# Patient Record
Sex: MALE | Race: BLACK OR AFRICAN AMERICAN | Employment: UNEMPLOYED | URBAN - METROPOLITAN AREA
[De-identification: names, ages, dates, MRNs, and addresses within clinical notes are randomized per-mention and may not be internally consistent; named-entity substitution may affect disease eponyms.]

---

## 2024-05-03 ENCOUNTER — HOSPITAL ENCOUNTER (INPATIENT)
Facility: HOSPITAL | Age: 74
LOS: 7 days | Discharge: HOME | End: 2024-05-10
Attending: INTERNAL MEDICINE | Admitting: INTERNAL MEDICINE
Payer: MEDICARE

## 2024-05-03 ENCOUNTER — APPOINTMENT (OUTPATIENT)
Facility: HOSPITAL | Age: 74
End: 2024-05-03
Payer: MEDICARE

## 2024-05-03 DIAGNOSIS — N45.1 LEFT EPIDIDYMITIS: Primary | ICD-10-CM

## 2024-05-03 DIAGNOSIS — N30.01 ACUTE CYSTITIS WITH HEMATURIA: ICD-10-CM

## 2024-05-03 PROBLEM — M19.90 OSTEOARTHROSIS: Status: ACTIVE | Noted: 2017-05-31

## 2024-05-03 PROBLEM — N13.8 BENIGN PROSTATIC HYPERPLASIA WITH URINARY OBSTRUCTION: Status: ACTIVE | Noted: 2017-05-31

## 2024-05-03 PROBLEM — G47.33 OBSTRUCTIVE SLEEP APNEA: Status: ACTIVE | Noted: 2018-08-14

## 2024-05-03 PROBLEM — N40.1 BENIGN PROSTATIC HYPERPLASIA WITH URINARY OBSTRUCTION: Status: ACTIVE | Noted: 2017-05-31

## 2024-05-03 PROBLEM — N39.0 UTI (URINARY TRACT INFECTION): Status: ACTIVE | Noted: 2024-05-03

## 2024-05-03 PROBLEM — Z72.0 TOBACCO USER: Status: ACTIVE | Noted: 2017-05-31

## 2024-05-03 PROBLEM — N40.0 BPH WITH ELEVATED PSA: Status: ACTIVE | Noted: 2024-03-14

## 2024-05-03 PROBLEM — Z86.010 HISTORY OF COLONIC POLYPS: Status: ACTIVE | Noted: 2017-11-29

## 2024-05-03 PROBLEM — R97.20 BPH WITH ELEVATED PSA: Status: ACTIVE | Noted: 2024-03-14

## 2024-05-03 LAB
ALBUMIN SERPL-MCNC: 2.6 G/DL (ref 3.5–5)
ALBUMIN/GLOB SERPL: 0.4 (ref 1.1–2.2)
ALP SERPL-CCNC: 114 U/L (ref 45–117)
ALT SERPL-CCNC: 30 U/L (ref 12–78)
ANION GAP BLD CALC-SCNC: 12 (ref 10–20)
ANION GAP SERPL CALC-SCNC: 8 MMOL/L (ref 5–15)
APPEARANCE UR: ABNORMAL
AST SERPL-CCNC: 22 U/L (ref 15–37)
BACTERIA URNS QL MICRO: ABNORMAL /HPF
BASOPHILS # BLD: 0 K/UL (ref 0–0.1)
BASOPHILS NFR BLD: 0 % (ref 0–1)
BILIRUB SERPL-MCNC: 0.6 MG/DL (ref 0.2–1)
BILIRUB UR QL: NEGATIVE
BUN SERPL-MCNC: 14 MG/DL (ref 6–20)
BUN/CREAT SERPL: 12 (ref 12–20)
CA-I BLD-MCNC: 1.09 MMOL/L (ref 1.12–1.32)
CALCIUM SERPL-MCNC: 8.7 MG/DL (ref 8.5–10.1)
CHLORIDE BLD-SCNC: 98 MMOL/L (ref 100–108)
CHLORIDE SERPL-SCNC: 96 MMOL/L (ref 97–108)
CO2 BLD-SCNC: 26 MMOL/L (ref 19–24)
CO2 SERPL-SCNC: 29 MMOL/L (ref 21–32)
COLOR UR: YELLOW
CREAT SERPL-MCNC: 1.2 MG/DL (ref 0.7–1.3)
CREAT UR-MCNC: 0.9 MG/DL (ref 0.6–1.3)
DIFFERENTIAL METHOD BLD: ABNORMAL
EOSINOPHIL # BLD: 0 K/UL (ref 0–0.4)
EOSINOPHIL NFR BLD: 0 % (ref 0–7)
EPITH CASTS URNS QL MICRO: ABNORMAL /LPF
ERYTHROCYTE [DISTWIDTH] IN BLOOD BY AUTOMATED COUNT: 17.7 % (ref 11.5–14.5)
GLOBULIN SER CALC-MCNC: 6.3 G/DL (ref 2–4)
GLUCOSE BLD STRIP.AUTO-MCNC: 109 MG/DL (ref 74–106)
GLUCOSE SERPL-MCNC: 114 MG/DL (ref 65–100)
GLUCOSE UR STRIP.AUTO-MCNC: NEGATIVE MG/DL
HCT VFR BLD AUTO: 33.6 % (ref 36.6–50.3)
HGB BLD-MCNC: 10.5 G/DL (ref 12.1–17)
HGB UR QL STRIP: ABNORMAL
IMM GRANULOCYTES # BLD AUTO: 0 K/UL (ref 0–0.04)
IMM GRANULOCYTES NFR BLD AUTO: 0 % (ref 0–0.5)
KETONES UR QL STRIP.AUTO: NEGATIVE MG/DL
LACTATE BLD-SCNC: 1.25 MMOL/L (ref 0.4–2)
LEUKOCYTE ESTERASE UR QL STRIP.AUTO: ABNORMAL
LYMPHOCYTES # BLD: 1.1 K/UL (ref 0.8–3.5)
LYMPHOCYTES NFR BLD: 10 % (ref 12–49)
MCH RBC QN AUTO: 26.2 PG (ref 26–34)
MCHC RBC AUTO-ENTMCNC: 31.3 G/DL (ref 30–36.5)
MCV RBC AUTO: 83.8 FL (ref 80–99)
MONOCYTES # BLD: 1.1 K/UL (ref 0–1)
MONOCYTES NFR BLD: 10 % (ref 5–13)
NEUTS SEG # BLD: 8.2 K/UL (ref 1.8–8)
NEUTS SEG NFR BLD: 80 % (ref 32–75)
NITRITE UR QL STRIP.AUTO: POSITIVE
NRBC # BLD: 0 K/UL (ref 0–0.01)
NRBC BLD-RTO: 0 PER 100 WBC
PH UR STRIP: 7.5 (ref 5–8)
PLATELET # BLD AUTO: 346 K/UL (ref 150–400)
PMV BLD AUTO: 10.1 FL (ref 8.9–12.9)
POTASSIUM BLD-SCNC: 4.5 MMOL/L (ref 3.5–5.5)
POTASSIUM SERPL-SCNC: 3.9 MMOL/L (ref 3.5–5.1)
PROCALCITONIN SERPL-MCNC: 0.2 NG/ML
PROT SERPL-MCNC: 8.9 G/DL (ref 6.4–8.2)
PROT UR STRIP-MCNC: 100 MG/DL
RBC # BLD AUTO: 4.01 M/UL (ref 4.1–5.7)
RBC #/AREA URNS HPF: ABNORMAL /HPF (ref 0–5)
SERVICE CMNT-IMP: ABNORMAL
SODIUM BLD-SCNC: 136 MMOL/L (ref 136–145)
SODIUM SERPL-SCNC: 133 MMOL/L (ref 136–145)
SP GR UR REFRACTOMETRY: ABNORMAL (ref 1–1.03)
SPECIMEN SITE: ABNORMAL
TROPONIN I SERPL HS-MCNC: 7 NG/L (ref 0–76)
URINE CULTURE IF INDICATED: ABNORMAL
UROBILINOGEN UR QL STRIP.AUTO: 1 EU/DL (ref 0.2–1)
WBC # BLD AUTO: 10.4 K/UL (ref 4.1–11.1)
WBC URNS QL MICRO: ABNORMAL /HPF (ref 0–4)

## 2024-05-03 PROCEDURE — 80053 COMPREHEN METABOLIC PANEL: CPT

## 2024-05-03 PROCEDURE — 96375 TX/PRO/DX INJ NEW DRUG ADDON: CPT

## 2024-05-03 PROCEDURE — 71045 X-RAY EXAM CHEST 1 VIEW: CPT

## 2024-05-03 PROCEDURE — 2580000003 HC RX 258: Performed by: HOSPITALIST

## 2024-05-03 PROCEDURE — 6360000004 HC RX CONTRAST MEDICATION: Performed by: NURSE PRACTITIONER

## 2024-05-03 PROCEDURE — 6370000000 HC RX 637 (ALT 250 FOR IP): Performed by: NURSE PRACTITIONER

## 2024-05-03 PROCEDURE — 36415 COLL VENOUS BLD VENIPUNCTURE: CPT

## 2024-05-03 PROCEDURE — 81001 URINALYSIS AUTO W/SCOPE: CPT

## 2024-05-03 PROCEDURE — 76870 US EXAM SCROTUM: CPT

## 2024-05-03 PROCEDURE — 87088 URINE BACTERIA CULTURE: CPT

## 2024-05-03 PROCEDURE — C9113 INJ PANTOPRAZOLE SODIUM, VIA: HCPCS | Performed by: NURSE PRACTITIONER

## 2024-05-03 PROCEDURE — 1200000000 HC SEMI PRIVATE

## 2024-05-03 PROCEDURE — 6360000002 HC RX W HCPCS: Performed by: NURSE PRACTITIONER

## 2024-05-03 PROCEDURE — 87086 URINE CULTURE/COLONY COUNT: CPT

## 2024-05-03 PROCEDURE — 6360000002 HC RX W HCPCS: Performed by: EMERGENCY MEDICINE

## 2024-05-03 PROCEDURE — 80047 BASIC METABLC PNL IONIZED CA: CPT

## 2024-05-03 PROCEDURE — 93005 ELECTROCARDIOGRAM TRACING: CPT | Performed by: NURSE PRACTITIONER

## 2024-05-03 PROCEDURE — 2580000003 HC RX 258: Performed by: INTERNAL MEDICINE

## 2024-05-03 PROCEDURE — 99285 EMERGENCY DEPT VISIT HI MDM: CPT

## 2024-05-03 PROCEDURE — 74177 CT ABD & PELVIS W/CONTRAST: CPT

## 2024-05-03 PROCEDURE — A4216 STERILE WATER/SALINE, 10 ML: HCPCS | Performed by: NURSE PRACTITIONER

## 2024-05-03 PROCEDURE — 87040 BLOOD CULTURE FOR BACTERIA: CPT

## 2024-05-03 PROCEDURE — 6360000002 HC RX W HCPCS: Performed by: HOSPITALIST

## 2024-05-03 PROCEDURE — 6360000002 HC RX W HCPCS: Performed by: INTERNAL MEDICINE

## 2024-05-03 PROCEDURE — 87186 SC STD MICRODIL/AGAR DIL: CPT

## 2024-05-03 PROCEDURE — 84484 ASSAY OF TROPONIN QUANT: CPT

## 2024-05-03 PROCEDURE — 83605 ASSAY OF LACTIC ACID: CPT

## 2024-05-03 PROCEDURE — 85025 COMPLETE CBC W/AUTO DIFF WBC: CPT

## 2024-05-03 PROCEDURE — 2580000003 HC RX 258: Performed by: NURSE PRACTITIONER

## 2024-05-03 PROCEDURE — 2580000003 HC RX 258: Performed by: EMERGENCY MEDICINE

## 2024-05-03 PROCEDURE — 96374 THER/PROPH/DIAG INJ IV PUSH: CPT

## 2024-05-03 PROCEDURE — 84145 PROCALCITONIN (PCT): CPT

## 2024-05-03 RX ORDER — POLYETHYLENE GLYCOL 3350 17 G/17G
17 POWDER, FOR SOLUTION ORAL DAILY PRN
Status: DISCONTINUED | OUTPATIENT
Start: 2024-05-03 | End: 2024-05-10 | Stop reason: HOSPADM

## 2024-05-03 RX ORDER — TAMSULOSIN HYDROCHLORIDE 0.4 MG/1
0.4 CAPSULE ORAL DAILY
Status: DISCONTINUED | OUTPATIENT
Start: 2024-05-04 | End: 2024-05-10 | Stop reason: HOSPADM

## 2024-05-03 RX ORDER — MORPHINE SULFATE 2 MG/ML
1 INJECTION, SOLUTION INTRAMUSCULAR; INTRAVENOUS ONCE
Status: COMPLETED | OUTPATIENT
Start: 2024-05-03 | End: 2024-05-03

## 2024-05-03 RX ORDER — ACETAMINOPHEN 500 MG
1000 TABLET ORAL
Status: COMPLETED | OUTPATIENT
Start: 2024-05-03 | End: 2024-05-03

## 2024-05-03 RX ORDER — ACETAMINOPHEN 325 MG/1
650 TABLET ORAL EVERY 6 HOURS PRN
Status: DISCONTINUED | OUTPATIENT
Start: 2024-05-03 | End: 2024-05-10 | Stop reason: HOSPADM

## 2024-05-03 RX ORDER — LOSARTAN POTASSIUM 25 MG/1
25 TABLET ORAL DAILY
Status: DISCONTINUED | OUTPATIENT
Start: 2024-05-04 | End: 2024-05-10 | Stop reason: HOSPADM

## 2024-05-03 RX ORDER — POTASSIUM CHLORIDE 750 MG/1
40 TABLET, FILM COATED, EXTENDED RELEASE ORAL PRN
Status: DISCONTINUED | OUTPATIENT
Start: 2024-05-03 | End: 2024-05-10 | Stop reason: HOSPADM

## 2024-05-03 RX ORDER — ONDANSETRON 4 MG/1
4 TABLET, ORALLY DISINTEGRATING ORAL EVERY 8 HOURS PRN
Status: DISCONTINUED | OUTPATIENT
Start: 2024-05-03 | End: 2024-05-10 | Stop reason: HOSPADM

## 2024-05-03 RX ORDER — ACETAMINOPHEN 650 MG/1
650 SUPPOSITORY RECTAL EVERY 6 HOURS PRN
Status: DISCONTINUED | OUTPATIENT
Start: 2024-05-03 | End: 2024-05-10 | Stop reason: HOSPADM

## 2024-05-03 RX ORDER — OXYCODONE HYDROCHLORIDE 5 MG/1
5 TABLET ORAL EVERY 4 HOURS PRN
Status: DISCONTINUED | OUTPATIENT
Start: 2024-05-03 | End: 2024-05-10 | Stop reason: HOSPADM

## 2024-05-03 RX ORDER — ATORVASTATIN CALCIUM 40 MG/1
40 TABLET, FILM COATED ORAL DAILY
Status: ON HOLD | COMMUNITY
End: 2024-05-05 | Stop reason: ALTCHOICE

## 2024-05-03 RX ORDER — POTASSIUM CHLORIDE 7.45 MG/ML
10 INJECTION INTRAVENOUS PRN
Status: DISCONTINUED | OUTPATIENT
Start: 2024-05-03 | End: 2024-05-10 | Stop reason: HOSPADM

## 2024-05-03 RX ORDER — SODIUM CHLORIDE 0.9 % (FLUSH) 0.9 %
5-40 SYRINGE (ML) INJECTION PRN
Status: DISCONTINUED | OUTPATIENT
Start: 2024-05-03 | End: 2024-05-10 | Stop reason: HOSPADM

## 2024-05-03 RX ORDER — ENOXAPARIN SODIUM 100 MG/ML
40 INJECTION SUBCUTANEOUS DAILY
Status: DISCONTINUED | OUTPATIENT
Start: 2024-05-03 | End: 2024-05-10 | Stop reason: HOSPADM

## 2024-05-03 RX ORDER — ONDANSETRON 2 MG/ML
4 INJECTION INTRAMUSCULAR; INTRAVENOUS EVERY 6 HOURS PRN
Status: DISCONTINUED | OUTPATIENT
Start: 2024-05-03 | End: 2024-05-10 | Stop reason: HOSPADM

## 2024-05-03 RX ORDER — TAMSULOSIN HYDROCHLORIDE 0.4 MG/1
0.4 CAPSULE ORAL DAILY
COMMUNITY
Start: 2022-09-08

## 2024-05-03 RX ORDER — SODIUM CHLORIDE 9 MG/ML
INJECTION, SOLUTION INTRAVENOUS PRN
Status: DISCONTINUED | OUTPATIENT
Start: 2024-05-03 | End: 2024-05-10 | Stop reason: HOSPADM

## 2024-05-03 RX ORDER — MORPHINE SULFATE 2 MG/ML
2 INJECTION, SOLUTION INTRAMUSCULAR; INTRAVENOUS
Status: COMPLETED | OUTPATIENT
Start: 2024-05-03 | End: 2024-05-03

## 2024-05-03 RX ORDER — LOSARTAN POTASSIUM 25 MG/1
TABLET ORAL
COMMUNITY
Start: 2022-09-08

## 2024-05-03 RX ORDER — 0.9 % SODIUM CHLORIDE 0.9 %
30 INTRAVENOUS SOLUTION INTRAVENOUS ONCE
Status: COMPLETED | OUTPATIENT
Start: 2024-05-03 | End: 2024-05-03

## 2024-05-03 RX ORDER — OXYCODONE HYDROCHLORIDE 5 MG/1
2.5 TABLET ORAL EVERY 4 HOURS PRN
Status: DISCONTINUED | OUTPATIENT
Start: 2024-05-03 | End: 2024-05-10 | Stop reason: HOSPADM

## 2024-05-03 RX ORDER — MAGNESIUM SULFATE IN WATER 40 MG/ML
2000 INJECTION, SOLUTION INTRAVENOUS PRN
Status: DISCONTINUED | OUTPATIENT
Start: 2024-05-03 | End: 2024-05-10 | Stop reason: HOSPADM

## 2024-05-03 RX ORDER — SODIUM CHLORIDE 0.9 % (FLUSH) 0.9 %
5-40 SYRINGE (ML) INJECTION EVERY 12 HOURS SCHEDULED
Status: DISCONTINUED | OUTPATIENT
Start: 2024-05-03 | End: 2024-05-10 | Stop reason: HOSPADM

## 2024-05-03 RX ORDER — SODIUM CHLORIDE 9 MG/ML
INJECTION, SOLUTION INTRAVENOUS CONTINUOUS
Status: DISCONTINUED | OUTPATIENT
Start: 2024-05-03 | End: 2024-05-08

## 2024-05-03 RX ADMIN — SODIUM CHLORIDE 2367 ML: 9 INJECTION, SOLUTION INTRAVENOUS at 16:43

## 2024-05-03 RX ADMIN — IOPAMIDOL 100 ML: 755 INJECTION, SOLUTION INTRAVENOUS at 17:24

## 2024-05-03 RX ADMIN — ENOXAPARIN SODIUM 40 MG: 100 INJECTION SUBCUTANEOUS at 18:50

## 2024-05-03 RX ADMIN — PANTOPRAZOLE SODIUM 40 MG: 40 INJECTION, POWDER, FOR SOLUTION INTRAVENOUS at 17:40

## 2024-05-03 RX ADMIN — MORPHINE SULFATE 2 MG: 2 INJECTION, SOLUTION INTRAMUSCULAR; INTRAVENOUS at 17:40

## 2024-05-03 RX ADMIN — SODIUM CHLORIDE, PRESERVATIVE FREE 10 ML: 5 INJECTION INTRAVENOUS at 20:39

## 2024-05-03 RX ADMIN — VANCOMYCIN HYDROCHLORIDE 2000 MG: 1 INJECTION, POWDER, LYOPHILIZED, FOR SOLUTION INTRAVENOUS at 17:38

## 2024-05-03 RX ADMIN — CEFEPIME 2000 MG: 2 INJECTION, POWDER, FOR SOLUTION INTRAVENOUS at 16:41

## 2024-05-03 RX ADMIN — SODIUM CHLORIDE: 900 INJECTION, SOLUTION INTRAVENOUS at 21:36

## 2024-05-03 RX ADMIN — MORPHINE SULFATE 1 MG: 2 INJECTION, SOLUTION INTRAMUSCULAR; INTRAVENOUS at 20:40

## 2024-05-03 RX ADMIN — ACETAMINOPHEN 1000 MG: 500 TABLET ORAL at 16:40

## 2024-05-03 ASSESSMENT — PAIN SCALES - GENERAL
PAINLEVEL_OUTOF10: 9
PAINLEVEL_OUTOF10: 10
PAINLEVEL_OUTOF10: 2
PAINLEVEL_OUTOF10: 9
PAINLEVEL_OUTOF10: 9

## 2024-05-03 ASSESSMENT — PAIN DESCRIPTION - ORIENTATION
ORIENTATION: LEFT
ORIENTATION: LOWER
ORIENTATION: LEFT

## 2024-05-03 ASSESSMENT — PAIN DESCRIPTION - ONSET: ONSET: ON-GOING

## 2024-05-03 ASSESSMENT — PAIN DESCRIPTION - DESCRIPTORS
DESCRIPTORS: ACHING
DESCRIPTORS: DISCOMFORT

## 2024-05-03 ASSESSMENT — PAIN DESCRIPTION - LOCATION
LOCATION: GROIN
LOCATION: OTHER (COMMENT)

## 2024-05-03 ASSESSMENT — PAIN - FUNCTIONAL ASSESSMENT: PAIN_FUNCTIONAL_ASSESSMENT: 0-10

## 2024-05-03 ASSESSMENT — PAIN DESCRIPTION - FREQUENCY: FREQUENCY: CONTINUOUS

## 2024-05-03 NOTE — PROGRESS NOTES
1846) ..TRANSFER - IN REPORT:    Verbal report received from GERRI Almonte on Joseph Grant  being received from ED for routine progression of patient care      Report consisted of patient's Situation, Background, Assessment and   Recommendations(SBAR).     Information from the following report(s) Nurse Handoff Report, Index, MAR, Recent Results, and Cardiac Rhythm NSR  was reviewed with the receiving nurse.    Opportunity for questions and clarification was provided.      Assessment completed upon patient's arrival to unit and care assumed.

## 2024-05-03 NOTE — H&P
Hospitalist Admission Note    NAME: Joseph Grant   :  1950   MRN:  [id number]     Date/Time:  5/3/2024 7:44 PM    Patient PCP: Terrell Mckeon MD  _____________________________________________________________________  Given the patient's current clinical presentation, I have a high level of concern for decompensation if discharged from the emergency department.  Complex decision making was performed, which includes reviewing the patient's available past medical records, laboratory results, and x-ray films.       My assessment of this patient's clinical condition and my plan of care is as follows.    Assessment / Plan:    Complicated  UTI  Left epididymitis/possible early orchitis  BPH/Urinary retention requiring crawford since   Scheduled for prostatectomy by urology on 6/3.       CT abd   1. Scrotal wall edema without drainable abscess or soft tissue gas  2. Enlarged prostate    US Left epididymitis more than orchitis. Small hydrocele. No abscess.   No mass or torsion.    Admit to Hospitalist service  IV  Abx  IVF  Follow up cx  Analgesia          Other Past Medical History :  BPH/ Urinary Retention  - maintain crawford (qmonthly exchanges)  - continue flomax  -Scheduled for prostatectomy by urology on 6/3.   - OP [facility] urology follow up    HTN  - on losartan   at home       Pharmacy  Consulted  &   Nurses Communication  Ordered for Home  Medication  Reconciliation.Primary hospitalitis Physician team to Follow up with that in am and reconcile home medication       Code Status: Full  DVT Prophylaxis: sq lovenox  GI Prophylaxis: not indicated  Baseline: independent        Subjective:   CHIEF COMPLAINT:  Groin Pain  Pt presents to ED ambulatory complaining of left sided constant groin pain since this morning. Pt present with indwelling urinary catheter with leg bag. States change urinary catheter bag two weeks ago. Also has foul smelling urine. Fever in triage.           HISTORY OF PRESENT ILLNESS:    1.0 K/UL    Eosinophils Absolute 0.0 0.0 - 0.4 K/UL    Basophils Absolute 0.0 0.0 - 0.1 K/UL    Immature Granulocytes Absolute 0.0 0.00 - 0.04 K/UL    Differential Type AUTOMATED     Comprehensive Metabolic Panel    Collection Time: 05/03/24  4:26 PM   Result Value Ref Range    Sodium 133 (L) 136 - 145 mmol/L    Potassium 3.9 3.5 - 5.1 mmol/L    Chloride 96 (L) 97 - 108 mmol/L    CO2 29 21 - 32 mmol/L    Anion Gap 8 5 - 15 mmol/L    Glucose 114 (H) 65 - 100 mg/dL    BUN 14 6 - 20 MG/DL    Creatinine 1.20 0.70 - 1.30 MG/DL    Bun/Cre Ratio 12 12 - 20      Est, Glom Filt Rate 64 >60 ml/min/1.73m2    Calcium 8.7 8.5 - 10.1 MG/DL    Total Bilirubin 0.6 0.2 - 1.0 MG/DL    ALT 30 12 - 78 U/L    AST 22 15 - 37 U/L    Alk Phosphatase 114 45 - 117 U/L    Total Protein 8.9 (H) 6.4 - 8.2 g/dL    Albumin 2.6 (L) 3.5 - 5.0 g/dL    Globulin 6.3 (H) 2.0 - 4.0 g/dL    Albumin/Globulin Ratio 0.4 (L) 1.1 - 2.2     Troponin    Collection Time: 05/03/24  4:26 PM   Result Value Ref Range    Troponin, High Sensitivity 7 0 - 76 ng/L   Urinalysis with Reflex to Culture    Collection Time: 05/03/24  4:26 PM    Specimen: Urine   Result Value Ref Range    Color, UA YELLOW      Appearance CLOUDY (A) CLEAR      Specific [place], UA 1.015  1.015   1.003 - 1.030      pH, Urine 7.5 5.0 - 8.0      Protein,  (A) NEG mg/dL    Glucose, Ur Negative NEG mg/dL    Ketones, Urine Negative NEG mg/dL    Bilirubin, Urine Negative NEG      Blood, Urine SMALL (A) NEG      Urobilinogen, Urine 1.0 0.2 - 1.0 EU/dL    Nitrite, Urine Positive (A) NEG      Leukocyte Esterase, Urine MODERATE (A) NEG      WBC, UA 10-20 0 - 4 /hpf    RBC, UA 0-5 0 - 5 /hpf    Epithelial Cells UA FEW FEW /lpf    BACTERIA, URINE 2+ (A) NEG /hpf    Urine Culture if Indicated URINE CULTURE ORDERED (A) CNI     POC CHEMISTRY (NA,K,ICA,GLU,CALC HCT/HGB,LACTATE,CREA,CL)    Collection Time: 05/03/24  4:31 PM   Result Value Ref Range    POC Sodium 136 136 - 145 MMOL/L    POC Potassium 4.5

## 2024-05-03 NOTE — ED NOTES
TRANSFER - OUT REPORT:    Verbal report given to Yael TALLEY on Joseph rGant  being transferred to [facility] for routine progression of patient care       Report consisted of patient's Situation, Background, Assessment and   Recommendations(SBAR).     Information from the following report(s) Nurse Handoff Report, ED Encounter Summary, ED SBAR, Adult Overview, MAR, Recent Results, and Cardiac Rhythm Sinus Rhythm  was reviewed with the receiving nurse.    [place] Assessment:    Presents to emergency department  because of falls (Syncope, seizure, or loss of consciousness): No  Age > 70: No  Altered Mental Status, Intoxication with alcohol or substance confusion (Disorientation, impaired judgment, poor safety awaremess, or inability to follow instructions): No  Impaired Mobility: Ambulates or transfers with assistive devices or assistance; Unable to ambulate or transer.: No  Nursing Judgement: No          Lines:   Peripheral IV 05/03/24 Left Forearm (Active)   Site Assessment Clean, dry & intact 05/03/24 1620   Line Status Blood return noted;Specimen collected;Normal saline locked;Flushed 05/03/24 1620   Line Care Connections checked and tightened 05/03/24 1620   Phlebitis Assessment No symptoms 05/03/24 1620   Infiltration Assessment 0 05/03/24 1620   Dressing Status Clean, dry & intact 05/03/24 1620   Dressing Type Transparent 05/03/24 1620   Dressing Intervention New 05/03/24 1620        Opportunity for questions and clarification was provided.      Patient transported with:  Monitor

## 2024-05-03 NOTE — ED TRIAGE NOTES
Pt presents with indwelling urinary catheter with leg bag and foul smelling urine. Pt has left sided groin pain starting this morning. Fever in triage.

## 2024-05-03 NOTE — ED PROVIDER NOTES
[facility] EMERGENCY DEPT  EMERGENCY DEPARTMENT ENCOUNTER       Pt Name: Joseph Grant  MRN: [id number]  Birthdate 1950  Date of evaluation: 5/3/2024  Provider: TAMRA Castle NP   PCP: Terrell Mckeon MD  Note Started: 5:25 PM 5/3/24     CHIEF COMPLAINT       Chief Complaint   Patient presents with    Groin Pain        HISTORY OF PRESENT ILLNESS: 1 or more elements      History Provided by: Patient   History is limited by: Nothing     Joseph Grant is a 73 y.o. male who presents cc left groin pain.  States pain started at 4:00 this morning.  States that he has a urinary blockage and is scheduled for surgery.  He denies dysuria back pain abdominal pain.  He denies hematuria.  He states that he has a Olmedo catheter because he has had difficulty voiding.  He denies nausea vomiting diarrhea.     Nursing Notes were all reviewed and agreed with or any disagreements were addressed in the HPI.     REVIEW OF SYSTEMS      Review of Systems   Constitutional:  Negative for fever.   HENT:  Negative for congestion.    Eyes:  Negative for visual disturbance.   Respiratory:  Negative for shortness of breath.    Cardiovascular:  Negative for chest pain.   Gastrointestinal:  Negative for abdominal pain.   Genitourinary:  Negative for difficulty urinating.        Groin pain   Musculoskeletal:  Negative for back pain and neck pain.   Skin:  Negative for rash.   Neurological:  Negative for dizziness, weakness and headaches.   Psychiatric/Behavioral:  Negative for behavioral problems.    All other systems reviewed and are negative.       Positives and Pertinent negatives as per HPI.    PAST HISTORY     Past Medical History:  Past Medical History:   Diagnosis Date    Arthritis     Arthritis     knees    Hypertension     Hypertension     , none for 3 years, cannot afford    Other ill-defined conditions(799.89) 2006    colonoscopy exc 3 polyps    Sebaceous cyst 11/6/2013    Smoker        Past Surgical History:  Past Surgical History:

## 2024-05-03 NOTE — ED NOTES
Pt presents to ED ambulatory complaining of left sided constant groin pain since this morning. Pt present with indwelling urinary catheter with leg bag. States change urinary catheter bag two weeks ago. Pt is alert and oriented x 4, RR even and unlabored, skin is warm and dry. Assessment completed and pt updated on plan of care.  Call bell in reach.          Emergency Department Nursing Plan of Care       The Nursing Plan of Care is developed from the Nursing assessment and Emergency Department Attending provider initial evaluation.  The plan of care may be reviewed in the “ED Provider note”.    The Plan of Care was developed with the following considerations:   Patient / Family readiness to learn indicated by:verbalized understanding  Persons(s) to be included in education: patient  Barriers to Learning/Limitations:None    Signed

## 2024-05-03 NOTE — ED NOTES
Verbal shift change report given to Maryjane RN (oncoming nurse) by Maulik RN (offgoing nurse). Report included the following information Nurse Handoff Report, ED SBAR, Adult Overview, Intake/Output, MAR, and Recent Results.

## 2024-05-04 LAB
ANION GAP SERPL CALC-SCNC: 10 MMOL/L (ref 5–15)
APPEARANCE UR: ABNORMAL
BACTERIA URNS QL MICRO: NEGATIVE /HPF
BILIRUB UR QL: NEGATIVE
BUN SERPL-MCNC: 10 MG/DL (ref 6–20)
BUN/CREAT SERPL: 8 (ref 12–20)
CALCIUM SERPL-MCNC: 8.5 MG/DL (ref 8.5–10.1)
CHLORIDE SERPL-SCNC: 103 MMOL/L (ref 97–108)
CO2 SERPL-SCNC: 28 MMOL/L (ref 21–32)
COLOR UR: ABNORMAL
CREAT SERPL-MCNC: 1.24 MG/DL (ref 0.7–1.3)
EPITH CASTS URNS QL MICRO: ABNORMAL /LPF
ERYTHROCYTE [DISTWIDTH] IN BLOOD BY AUTOMATED COUNT: 18 % (ref 11.5–14.5)
GLUCOSE BLD STRIP.AUTO-MCNC: 120 MG/DL (ref 65–117)
GLUCOSE SERPL-MCNC: 104 MG/DL (ref 65–100)
GLUCOSE UR STRIP.AUTO-MCNC: NEGATIVE MG/DL
HCT VFR BLD AUTO: 30.3 % (ref 36.6–50.3)
HGB BLD-MCNC: 9.3 G/DL (ref 12.1–17)
HGB UR QL STRIP: ABNORMAL
KETONES UR QL STRIP.AUTO: ABNORMAL MG/DL
LEUKOCYTE ESTERASE UR QL STRIP.AUTO: ABNORMAL
MCH RBC QN AUTO: 26.2 PG (ref 26–34)
MCHC RBC AUTO-ENTMCNC: 30.7 G/DL (ref 30–36.5)
MCV RBC AUTO: 85.4 FL (ref 80–99)
NITRITE UR QL STRIP.AUTO: NEGATIVE
NRBC # BLD: 0 K/UL (ref 0–0.01)
NRBC BLD-RTO: 0 PER 100 WBC
PH UR STRIP: 5.5 (ref 5–8)
PLATELET # BLD AUTO: 331 K/UL (ref 150–400)
PMV BLD AUTO: 10.2 FL (ref 8.9–12.9)
POTASSIUM SERPL-SCNC: 4.1 MMOL/L (ref 3.5–5.1)
PROT UR STRIP-MCNC: 100 MG/DL
RBC # BLD AUTO: 3.55 M/UL (ref 4.1–5.7)
RBC #/AREA URNS HPF: ABNORMAL /HPF (ref 0–5)
SERVICE CMNT-IMP: ABNORMAL
SODIUM SERPL-SCNC: 141 MMOL/L (ref 136–145)
SP GR UR REFRACTOMETRY: 1.02 (ref 1–1.03)
URINE CULTURE IF INDICATED: ABNORMAL
UROBILINOGEN UR QL STRIP.AUTO: 1 EU/DL (ref 0.2–1)
WBC # BLD AUTO: 13.6 K/UL (ref 4.1–11.1)
WBC URNS QL MICRO: ABNORMAL /HPF (ref 0–4)

## 2024-05-04 PROCEDURE — 1200000000 HC SEMI PRIVATE

## 2024-05-04 PROCEDURE — 6360000002 HC RX W HCPCS: Performed by: INTERNAL MEDICINE

## 2024-05-04 PROCEDURE — 6370000000 HC RX 637 (ALT 250 FOR IP): Performed by: HOSPITALIST

## 2024-05-04 PROCEDURE — 6360000002 HC RX W HCPCS: Performed by: NURSE PRACTITIONER

## 2024-05-04 PROCEDURE — 87186 SC STD MICRODIL/AGAR DIL: CPT

## 2024-05-04 PROCEDURE — C9113 INJ PANTOPRAZOLE SODIUM, VIA: HCPCS | Performed by: NURSE PRACTITIONER

## 2024-05-04 PROCEDURE — 2580000003 HC RX 258: Performed by: HOSPITALIST

## 2024-05-04 PROCEDURE — 82962 GLUCOSE BLOOD TEST: CPT

## 2024-05-04 PROCEDURE — 87088 URINE BACTERIA CULTURE: CPT

## 2024-05-04 PROCEDURE — 81001 URINALYSIS AUTO W/SCOPE: CPT

## 2024-05-04 PROCEDURE — A4216 STERILE WATER/SALINE, 10 ML: HCPCS | Performed by: NURSE PRACTITIONER

## 2024-05-04 PROCEDURE — 6370000000 HC RX 637 (ALT 250 FOR IP): Performed by: INTERNAL MEDICINE

## 2024-05-04 PROCEDURE — 2580000003 HC RX 258: Performed by: INTERNAL MEDICINE

## 2024-05-04 PROCEDURE — 87086 URINE CULTURE/COLONY COUNT: CPT

## 2024-05-04 PROCEDURE — 36415 COLL VENOUS BLD VENIPUNCTURE: CPT

## 2024-05-04 PROCEDURE — 2580000003 HC RX 258: Performed by: NURSE PRACTITIONER

## 2024-05-04 PROCEDURE — 80048 BASIC METABOLIC PNL TOTAL CA: CPT

## 2024-05-04 PROCEDURE — 85027 COMPLETE CBC AUTOMATED: CPT

## 2024-05-04 RX ORDER — LIDOCAINE HYDROCHLORIDE 20 MG/ML
JELLY TOPICAL
Status: COMPLETED | OUTPATIENT
Start: 2024-05-04 | End: 2024-05-04

## 2024-05-04 RX ORDER — NALOXONE HYDROCHLORIDE 0.4 MG/ML
0.4 INJECTION, SOLUTION INTRAMUSCULAR; INTRAVENOUS; SUBCUTANEOUS PRN
Status: DISCONTINUED | OUTPATIENT
Start: 2024-05-04 | End: 2024-05-10 | Stop reason: HOSPADM

## 2024-05-04 RX ADMIN — CEFEPIME 2000 MG: 2 INJECTION, POWDER, FOR SOLUTION INTRAVENOUS at 05:37

## 2024-05-04 RX ADMIN — OXYCODONE 2.5 MG: 5 TABLET ORAL at 23:10

## 2024-05-04 RX ADMIN — SODIUM CHLORIDE: 9 INJECTION, SOLUTION INTRAVENOUS at 17:05

## 2024-05-04 RX ADMIN — SODIUM CHLORIDE, PRESERVATIVE FREE 10 ML: 5 INJECTION INTRAVENOUS at 08:53

## 2024-05-04 RX ADMIN — SODIUM CHLORIDE: 900 INJECTION, SOLUTION INTRAVENOUS at 11:20

## 2024-05-04 RX ADMIN — CEFEPIME 2000 MG: 2 INJECTION, POWDER, FOR SOLUTION INTRAVENOUS at 17:06

## 2024-05-04 RX ADMIN — LIDOCAINE HYDROCHLORIDE: 20 JELLY TOPICAL at 10:52

## 2024-05-04 RX ADMIN — OXYCODONE 5 MG: 5 TABLET ORAL at 15:18

## 2024-05-04 RX ADMIN — TAMSULOSIN HYDROCHLORIDE 0.4 MG: 0.4 CAPSULE ORAL at 08:43

## 2024-05-04 RX ADMIN — ENOXAPARIN SODIUM 40 MG: 100 INJECTION SUBCUTANEOUS at 18:59

## 2024-05-04 RX ADMIN — SODIUM CHLORIDE, PRESERVATIVE FREE 10 ML: 5 INJECTION INTRAVENOUS at 21:31

## 2024-05-04 RX ADMIN — PANTOPRAZOLE SODIUM 40 MG: 40 INJECTION, POWDER, FOR SOLUTION INTRAVENOUS at 08:43

## 2024-05-04 RX ADMIN — SODIUM CHLORIDE: 900 INJECTION, SOLUTION INTRAVENOUS at 21:33

## 2024-05-04 ASSESSMENT — PAIN DESCRIPTION - ORIENTATION
ORIENTATION: LOWER
ORIENTATION: LEFT

## 2024-05-04 ASSESSMENT — PAIN SCALES - GENERAL
PAINLEVEL_OUTOF10: 5
PAINLEVEL_OUTOF10: 1
PAINLEVEL_OUTOF10: 6
PAINLEVEL_OUTOF10: 7
PAINLEVEL_OUTOF10: 7

## 2024-05-04 ASSESSMENT — PAIN DESCRIPTION - DESCRIPTORS
DESCRIPTORS: ACHING
DESCRIPTORS: ACHING;HEAVINESS

## 2024-05-04 ASSESSMENT — PAIN DESCRIPTION - LOCATION
LOCATION: GROIN
LOCATION: GROIN

## 2024-05-04 NOTE — PROGRESS NOTES
0845) Discuss holding Losartan with Dr. Silva for /66.  Order for crawford insertion.  0857)..Message to Dr. Silva--can we have Urojet ordered for crawford insertion?

## 2024-05-04 NOTE — PROGRESS NOTES
Bedside and Verbal shift change report given to GERRI Conley (oncoming nurse) by GERRI Farmer (offgoing nurse). Report included the following information Nurse Handoff Report, Index, Intake/Output, MAR, and Recent Results.

## 2024-05-04 NOTE — PROGRESS NOTES
2009-Message to on call provider Dr. Carter:  Patient admitted for UTI and has arrived from ED, tele robot @ bedside. Hx of HTN, smoker, arthritis, and urinary blockage. Patient is c/o \"9/10\" groin pain and does not have PRN pain medication on MAR. May I have a PRN order please?    2011-New order received.    0630-Obtained blood work for am labs and sent to lab.

## 2024-05-04 NOTE — CARE COORDINATION
05/04/24 1203   Service Assessment   Patient Orientation Alert and Oriented   Cognition Alert   History Provided By Patient   Primary Caregiver Self   Support Systems Children   Patient's Healthcare Decision Maker is: Legal Next of Kin   PCP Verified by CM Yes   Last Visit to PCP Within last 3 months   Prior Functional Level Independent in ADLs/IADLs   Current Functional Level Independent in ADLs/IADLs   Can patient return to prior living arrangement Yes   Ability to make needs known: Good   Family able to assist with home care needs: Other (comment)  (has a friend who can help, sons out of town)   Would you like for me to discuss the discharge plan with any other family members/significant others, and if so, who? No   Financial Resources Medicare [facility]   Social/Functional History   Lives With Alone;Other (comment)  (in assisted living facility)   Type of Home Assisted living   Receives Help From Friend(s)   ADL Assistance Independent   Homemaking Assistance Independent   Ambulation Assistance Needs assistance  (uses cane)   Active  Yes   Mode of Transportation Car   Occupation Unemployed   Discharge Planning   Type of Residence Assisted living   Living Arrangements Alone   DME Ordered? No   Potential Assistance Purchasing Medications No   Patient expects to be discharged to: Assisted living   Services At/After Discharge   Transition of Care Consult (CM Consult) Discharge Planning    Resource Information Provided? No   Mode of Transport at Discharge Self   Confirm Follow Up Transport Self   Condition of Participation: Discharge Planning   The Plan for Transition of Care is related to the following treatment goals: discharge planning, pcp followup   The Patient and/or Patient Representative was provided with a Choice of Provider? Patient   The Patient and/Or Patient Representative agree with the Discharge Plan? Yes   Freedom of Choice list was provided with basic  hard copy, drawn during this pregnancy

## 2024-05-04 NOTE — PROGRESS NOTES
Screening     Physical abuse: Denies     Verbal abuse: Denies     Emotional abuse: Denies     Financial abuse: Denies     Sexual abuse: Denies   Utilities: Not At Risk (5/3/2024)    [facility] Utilities     Threatened with loss of utilities: No       Review of Systems:   Refer to subjective      Vital Signs:    Last 24hrs VS reviewed since prior progress note. Most recent are:  Vitals:    05/03/24 2040   BP:    Pulse:    Resp: 16   Temp:    SpO2:          Intake/Output Summary (Last 24 hours) at 5/4/2024 0813  Last data filed at 5/4/2024 0529  Gross per 24 hour   Intake 600 ml   Output 1275 ml   Net -675 ml        Physical Examination:     I had a face to face encounter with this patient and independently examined them on 5/4/2024 as outlined below:          General: No acute distress. Well developed, well nourished.  HEENT: Eyes: perrl, no discharge or icterus.   Cardiovascular: S1, S2, rrr, no mrg  Respiratory: clear to auscultation b/l  Abdomen: no suprpubic pain/tenderness, no flank pain/tenderness,  active bowel sounds on 4q, abdomen soft, nontender, no guarding or rigidity, no peritoneal signs  Extremities: No edema, cyanosis, (+2) bi dorsalis pedal pulse  Neurological:  a&ox3. No facial asymmetry. Normal speech.  Mental Status: calm, cooperative.      Data Review:    Review and/or order of clinical lab test  I personally reviewed  Image      I have personally and independently reviewed all pertinent labs, diagnostic studies, imaging, and have provided independent interpretation of the same.     Labs:     Recent Labs     05/03/24 1626 05/04/24  0634   WBC 10.4 13.6*   HGB 10.5* 9.3*   HCT 33.6* 30.3*    331     Recent Labs     05/03/24  1626 05/04/24  0634   * 141   K 3.9 4.1   CL 96* 103   CO2 29 28   BUN 14 10     Recent Labs     05/03/24 1626   ALT 30   GLOB 6.3*     No results for input(s): \"INR\", \"APTT\" in the last 72 hours.    Invalid input(s): \"PTP\"   No results for input(s): \"TIBC\", \"FERR\" in

## 2024-05-05 LAB
BASOPHILS # BLD: 0 K/UL (ref 0–0.1)
BASOPHILS NFR BLD: 0 % (ref 0–1)
DIFFERENTIAL METHOD BLD: ABNORMAL
EKG ATRIAL RATE: 80 BPM
EKG DIAGNOSIS: NORMAL
EKG P AXIS: 42 DEGREES
EKG P-R INTERVAL: 154 MS
EKG Q-T INTERVAL: 354 MS
EKG QRS DURATION: 98 MS
EKG QTC CALCULATION (BAZETT): 408 MS
EKG R AXIS: 54 DEGREES
EKG T AXIS: 30 DEGREES
EKG VENTRICULAR RATE: 80 BPM
EOSINOPHIL # BLD: 0.1 K/UL (ref 0–0.4)
EOSINOPHIL NFR BLD: 0 % (ref 0–7)
ERYTHROCYTE [DISTWIDTH] IN BLOOD BY AUTOMATED COUNT: 17.7 % (ref 11.5–14.5)
EST. AVERAGE GLUCOSE BLD GHB EST-MCNC: 111 MG/DL
HBA1C MFR BLD: 5.5 % (ref 4–5.6)
HCT VFR BLD AUTO: 28.5 % (ref 36.6–50.3)
HGB BLD-MCNC: 8.7 G/DL (ref 12.1–17)
IMM GRANULOCYTES # BLD AUTO: 0.1 K/UL (ref 0–0.04)
IMM GRANULOCYTES NFR BLD AUTO: 1 % (ref 0–0.5)
LYMPHOCYTES # BLD: 1.5 K/UL (ref 0.8–3.5)
LYMPHOCYTES NFR BLD: 13 % (ref 12–49)
MCH RBC QN AUTO: 26 PG (ref 26–34)
MCHC RBC AUTO-ENTMCNC: 30.5 G/DL (ref 30–36.5)
MCV RBC AUTO: 85.3 FL (ref 80–99)
MONOCYTES # BLD: 1.3 K/UL (ref 0–1)
MONOCYTES NFR BLD: 11 % (ref 5–13)
NEUTS SEG # BLD: 8.6 K/UL (ref 1.8–8)
NEUTS SEG NFR BLD: 75 % (ref 32–75)
NRBC # BLD: 0 K/UL (ref 0–0.01)
NRBC BLD-RTO: 0 PER 100 WBC
PLATELET # BLD AUTO: 327 K/UL (ref 150–400)
PMV BLD AUTO: 10.2 FL (ref 8.9–12.9)
RBC # BLD AUTO: 3.34 M/UL (ref 4.1–5.7)
WBC # BLD AUTO: 11.6 K/UL (ref 4.1–11.1)

## 2024-05-05 PROCEDURE — 93010 ELECTROCARDIOGRAM REPORT: CPT | Performed by: SPECIALIST

## 2024-05-05 PROCEDURE — 6370000000 HC RX 637 (ALT 250 FOR IP): Performed by: HOSPITALIST

## 2024-05-05 PROCEDURE — A4216 STERILE WATER/SALINE, 10 ML: HCPCS | Performed by: NURSE PRACTITIONER

## 2024-05-05 PROCEDURE — 2580000003 HC RX 258: Performed by: NURSE PRACTITIONER

## 2024-05-05 PROCEDURE — 6360000002 HC RX W HCPCS: Performed by: INTERNAL MEDICINE

## 2024-05-05 PROCEDURE — 36415 COLL VENOUS BLD VENIPUNCTURE: CPT

## 2024-05-05 PROCEDURE — 1200000000 HC SEMI PRIVATE

## 2024-05-05 PROCEDURE — 6360000002 HC RX W HCPCS: Performed by: NURSE PRACTITIONER

## 2024-05-05 PROCEDURE — 2580000003 HC RX 258: Performed by: HOSPITALIST

## 2024-05-05 PROCEDURE — 83036 HEMOGLOBIN GLYCOSYLATED A1C: CPT

## 2024-05-05 PROCEDURE — 2580000003 HC RX 258: Performed by: INTERNAL MEDICINE

## 2024-05-05 PROCEDURE — 85025 COMPLETE CBC W/AUTO DIFF WBC: CPT

## 2024-05-05 PROCEDURE — C9113 INJ PANTOPRAZOLE SODIUM, VIA: HCPCS | Performed by: NURSE PRACTITIONER

## 2024-05-05 RX ORDER — MULTIVITAMIN WITH FOLIC ACID 400 MCG
500 TABLET ORAL DAILY
COMMUNITY

## 2024-05-05 RX ADMIN — CEFEPIME 2000 MG: 2 INJECTION, POWDER, FOR SOLUTION INTRAVENOUS at 17:40

## 2024-05-05 RX ADMIN — CEFEPIME 2000 MG: 2 INJECTION, POWDER, FOR SOLUTION INTRAVENOUS at 04:05

## 2024-05-05 RX ADMIN — SODIUM CHLORIDE: 900 INJECTION, SOLUTION INTRAVENOUS at 09:37

## 2024-05-05 RX ADMIN — SODIUM CHLORIDE: 9 INJECTION, SOLUTION INTRAVENOUS at 04:05

## 2024-05-05 RX ADMIN — SODIUM CHLORIDE, PRESERVATIVE FREE 10 ML: 5 INJECTION INTRAVENOUS at 21:53

## 2024-05-05 RX ADMIN — SODIUM CHLORIDE: 900 INJECTION, SOLUTION INTRAVENOUS at 07:53

## 2024-05-05 RX ADMIN — SODIUM CHLORIDE: 900 INJECTION, SOLUTION INTRAVENOUS at 19:26

## 2024-05-05 RX ADMIN — OXYCODONE 5 MG: 5 TABLET ORAL at 15:05

## 2024-05-05 RX ADMIN — ENOXAPARIN SODIUM 40 MG: 100 INJECTION SUBCUTANEOUS at 17:33

## 2024-05-05 RX ADMIN — LOSARTAN POTASSIUM 25 MG: 25 TABLET, FILM COATED ORAL at 08:41

## 2024-05-05 RX ADMIN — PANTOPRAZOLE SODIUM 40 MG: 40 INJECTION, POWDER, FOR SOLUTION INTRAVENOUS at 08:42

## 2024-05-05 RX ADMIN — OXYCODONE 2.5 MG: 5 TABLET ORAL at 21:53

## 2024-05-05 RX ADMIN — SODIUM CHLORIDE, PRESERVATIVE FREE 10 ML: 5 INJECTION INTRAVENOUS at 09:46

## 2024-05-05 RX ADMIN — SODIUM CHLORIDE: 9 INJECTION, SOLUTION INTRAVENOUS at 17:40

## 2024-05-05 RX ADMIN — SODIUM CHLORIDE, PRESERVATIVE FREE 10 ML: 5 INJECTION INTRAVENOUS at 08:50

## 2024-05-05 RX ADMIN — TAMSULOSIN HYDROCHLORIDE 0.4 MG: 0.4 CAPSULE ORAL at 08:41

## 2024-05-05 ASSESSMENT — PAIN SCALES - GENERAL
PAINLEVEL_OUTOF10: 7
PAINLEVEL_OUTOF10: 6
PAINLEVEL_OUTOF10: 6
PAINLEVEL_OUTOF10: 0

## 2024-05-05 ASSESSMENT — PAIN DESCRIPTION - ONSET
ONSET: ON-GOING
ONSET: ON-GOING

## 2024-05-05 ASSESSMENT — PAIN DESCRIPTION - LOCATION
LOCATION: GROIN

## 2024-05-05 ASSESSMENT — PAIN DESCRIPTION - DESCRIPTORS
DESCRIPTORS: ACHING
DESCRIPTORS: ACHING

## 2024-05-05 ASSESSMENT — PAIN DESCRIPTION - FREQUENCY
FREQUENCY: CONTINUOUS
FREQUENCY: CONTINUOUS

## 2024-05-05 ASSESSMENT — PAIN DESCRIPTION - ORIENTATION
ORIENTATION: LOWER
ORIENTATION: LOWER

## 2024-05-05 NOTE — PROGRESS NOTES
[facility]  Hospitalist Group                                                                                          Hospitalist Progress Note  Keo Silva MD  Office Phone: [phone]        Date of Service:  2024  NAME:  Joseph Grant  :  1950  MRN:  [id number]       Admission Summary:   73-year-old male past with history of BPH, urinary retention with chronic indwelling catheter BPH with urinary retention presents to the ED due to left groin pain.  Groin pain started in the morning at 4 AM and has progressively worsened.  Patient was recently admitted at [facility] on 2024 for complicated UTI.  He had a chronic Olmedo catheter due to urinary retention which was exchanged, started on tamsulosin, and scheduled for prostatectomy by urology on 2024.  His urine culture in the admission was positive for Enterobacter and he was given a 10-day course of levofloxacin ((3/25-4/3).  Patient arrived to the ED febrile with a Tmax of 102.7 Fahrenheit.  Pertinent physical exam in the ED noted patient having an ill appearance as well as left testicular tenderness on palpation.  Significant labs include sodium 133, chloride 96, lactic acid 1.25, albumin 2.6, troponin 7, WBC 10.4, hemoglobin 10.5, RDW 17.7.  UA was positive for UTI, reflex culture pending.  ECG shows normal sinus rhythm and LVH.  Ultrasound of scrotum showed left epididymis more than orchitis, small hydrocele and no evidence of torsion..  CT abdomen pelvis showed scrotal wall edema without drainable abscess or soft tissue gas, as well as enlarged prostate.  Patient was admitted for complicated UTI and epididymitis.    Interval history / Subjective:   Patient endorsed  scutum pain, intermittent. Suprapubic pain had resolved earlier.     Currently, patient denies headache, vision or hearing changes, fever, chills, weakness, chest pain, dyspnea, cough, abd pain, N,V, blood in stool, melena, blood in

## 2024-05-05 NOTE — PROGRESS NOTES
Bedside and Verbal shift change report given to MAGUE Ragsdale (oncoming nurse) by GERRI Pacheco (offgoing nurse). Report included the following information Nurse Handoff Report, MAR, and Recent Results.

## 2024-05-05 NOTE — PROGRESS NOTES
Bedside and Verbal shift change report given to GERRI Pacheco (oncoming nurse) by GERRI Farmer (offgoing nurse). Report included the following information Nurse Handoff Report, Index, Intake/Output, MAR, and Recent Results.

## 2024-05-05 NOTE — PROGRESS NOTES
[facility] Admission Pharmacy Medication Reconciliation    Information obtained from:Patient and daughter, Buffy  RxQuery data available1:Yes    Medication changes (since last review):  Added  Vitamin B-12 gummies  Removed  atorvastatin  Adjusted  tamsulosin   1RxQuery pharmacy benefit data reflects medications filled and processed through the patient's insurance, however                this data does NOT capture whether the medication was picked up or is currently being taken by the patient.         Patient allergies:   Allergies as of 2024    (No Known Allergies)         Prior to Admission Medications   Prescriptions Last Dose Informant Patient Reported? Taking?   Cyanocobalamin ([employer] B12 GUMMIES) 500 MCG CHEW  Self Yes Yes   Sig: Take 500 mcg by mouth daily   losartan (COZAAR) 25 MG tablet  Self Yes Yes   Si tab(s) orally once a day for 90 days   tamsulosin (FLOMAX) 0.4 MG capsule  Self Yes Yes   Sig: Take 1 capsule by mouth daily          Thank you,  Chen Avila, PharmD, BCPS

## 2024-05-06 LAB
BACTERIA SPEC CULT: ABNORMAL
CC UR VC: ABNORMAL
SERVICE CMNT-IMP: ABNORMAL

## 2024-05-06 PROCEDURE — 1200000000 HC SEMI PRIVATE

## 2024-05-06 PROCEDURE — 6360000002 HC RX W HCPCS: Performed by: INTERNAL MEDICINE

## 2024-05-06 PROCEDURE — 2580000003 HC RX 258: Performed by: HOSPITALIST

## 2024-05-06 PROCEDURE — 6370000000 HC RX 637 (ALT 250 FOR IP): Performed by: HOSPITALIST

## 2024-05-06 PROCEDURE — 2580000003 HC RX 258: Performed by: INTERNAL MEDICINE

## 2024-05-06 PROCEDURE — 6370000000 HC RX 637 (ALT 250 FOR IP): Performed by: INTERNAL MEDICINE

## 2024-05-06 RX ADMIN — OXYCODONE 2.5 MG: 5 TABLET ORAL at 18:34

## 2024-05-06 RX ADMIN — SODIUM CHLORIDE: 9 INJECTION, SOLUTION INTRAVENOUS at 18:40

## 2024-05-06 RX ADMIN — CEFEPIME 2000 MG: 2 INJECTION, POWDER, FOR SOLUTION INTRAVENOUS at 18:41

## 2024-05-06 RX ADMIN — SODIUM CHLORIDE: 900 INJECTION, SOLUTION INTRAVENOUS at 15:28

## 2024-05-06 RX ADMIN — SODIUM CHLORIDE: 9 INJECTION, SOLUTION INTRAVENOUS at 21:44

## 2024-05-06 RX ADMIN — ENOXAPARIN SODIUM 40 MG: 100 INJECTION SUBCUTANEOUS at 18:35

## 2024-05-06 RX ADMIN — OXYCODONE 2.5 MG: 5 TABLET ORAL at 23:31

## 2024-05-06 RX ADMIN — LOSARTAN POTASSIUM 25 MG: 25 TABLET, FILM COATED ORAL at 08:57

## 2024-05-06 RX ADMIN — SODIUM CHLORIDE, PRESERVATIVE FREE 10 ML: 5 INJECTION INTRAVENOUS at 08:58

## 2024-05-06 RX ADMIN — MEROPENEM 1000 MG: 1 INJECTION, POWDER, FOR SOLUTION INTRAVENOUS at 21:45

## 2024-05-06 RX ADMIN — POLYETHYLENE GLYCOL 3350 17 G: 17 POWDER, FOR SOLUTION ORAL at 21:29

## 2024-05-06 RX ADMIN — SODIUM CHLORIDE: 9 INJECTION, SOLUTION INTRAVENOUS at 05:06

## 2024-05-06 RX ADMIN — TAMSULOSIN HYDROCHLORIDE 0.4 MG: 0.4 CAPSULE ORAL at 08:57

## 2024-05-06 RX ADMIN — OXYCODONE 2.5 MG: 5 TABLET ORAL at 11:23

## 2024-05-06 RX ADMIN — CEFEPIME 2000 MG: 2 INJECTION, POWDER, FOR SOLUTION INTRAVENOUS at 05:07

## 2024-05-06 RX ADMIN — SODIUM CHLORIDE: 900 INJECTION, SOLUTION INTRAVENOUS at 05:05

## 2024-05-06 ASSESSMENT — PAIN DESCRIPTION - LOCATION
LOCATION: SCROTUM
LOCATION: SCROTUM

## 2024-05-06 ASSESSMENT — PAIN SCALES - GENERAL
PAINLEVEL_OUTOF10: 0
PAINLEVEL_OUTOF10: 5
PAINLEVEL_OUTOF10: 5
PAINLEVEL_OUTOF10: 0
PAINLEVEL_OUTOF10: 5

## 2024-05-06 ASSESSMENT — PAIN DESCRIPTION - DESCRIPTORS
DESCRIPTORS: ACHING
DESCRIPTORS: ACHING

## 2024-05-06 NOTE — CARE COORDINATION
NASIR    RUR 11 %     IDR round this am with MD and team    Continue treatment   RED 24 hours     Plan    PCP on AVS   Urology  on AVS   Second IMM letter   Transportation     Jax Reaves MD PCP - General Family Medicine [phone] [address] [facility] [address]      Next Steps: Follow up on 5/13/2024  Instructions: PCP May 13, 2024  @ 3:30PM  at the [facility] Office [address] [place] [address].  Please arrive 15 min early  bring ID and Insurance Card this is a hospital follow-up    [facility]     [facility] [address] [place] [address] [phone]     Next Steps: Follow up on 5/17/2024  Instructions: please keep your appointment  May  17, 2024 2 11:15AM          Mary SANCHEZ RN    [phone]

## 2024-05-06 NOTE — PROGRESS NOTES
[facility] Adult  Hospitalist Group                                                                                          Hospitalist Progress Note  Keo Silva MD  Office Phone: [phone]        Date of Service:  2024  NAME:  Joseph Grant  :  1950  MRN:  [id number]       Admission Summary:   73-year-old male past with history of BPH, urinary retention with chronic indwelling catheter BPH with urinary retention presents to the ED due to left groin pain.  Groin pain started in the morning at 4 AM and has progressively worsened.  Patient was recently admitted at [facility] on 2024 for complicated UTI.  He had a chronic Olmedo catheter due to urinary retention which was exchanged, started on tamsulosin, and scheduled for prostatectomy by urology on 2024.  His urine culture in the admission was positive for Enterobacter and he was given a 10-day course of levofloxacin ((3/25-4/3).  Patient arrived to the ED febrile with a Tmax of 102.7 Fahrenheit.  Pertinent physical exam in the ED noted patient having an ill appearance as well as left testicular tenderness on palpation.  Significant labs include sodium 133, chloride 96, lactic acid 1.25, albumin 2.6, troponin 7, WBC 10.4, hemoglobin 10.5, RDW 17.7.  UA was positive for UTI, reflex culture pending.  ECG shows normal sinus rhythm and LVH.  Ultrasound of scrotum showed left epididymis more than orchitis, small hydrocele and no evidence of torsion..  CT abdomen pelvis showed scrotal wall edema without drainable abscess or soft tissue gas, as well as enlarged prostate.  Patient was admitted for complicated UTI and epididymitis.    Interval history / Subjective:   Patient endorsed  scutum pain, intermittent. Suprapubic pain had resolved earlier. No new changes.     Currently, patient denies headache, vision or hearing changes, fever, chills, weakness, chest pain, dyspnea, cough, abd pain, N,V, blood in stool,  melena, blood in urine, LE edema, numbness or tingling in extremities.       Assessment & Plan:        BPH  -on tamsulosin  -plan for prostatectomy by urology on 06/03/2024.      Epididymitis seen in U/S -not sexually active, cefepime will cover  Chronic indwelling catheter  Recent UTI   -febrile 102F  -Last uti positive for enterobacter, given a course a 10-day course of levofloxacin ((3/25-4/3).   -due to recent levofloxacin use and continued UTI, there is a concern for MDRO, as such, patient was empirically on cefepime IV  -unclear if the urine culture collected on the day of admission 05/03 was done without exchanging the crawford bag, its positive for ESBL ecoli, sensitive to meropenem   -a proper urine culture was completed on 05/04 (crawford was exchanged) currently showing gram negative rods  -for the time being will start empirically meropenem, until 05/04 urine culture returns.     HTN - losartan      Code status: full  Prophylaxis: lovenox  Central Line:   none  Care Plan discussed with: patient  Appointments after discharge:   urology, pcp  Anticipated Disposition: home   Inpatient  Cardiac monitoring: None         Social Determinants of Health     Tobacco Use: High Risk (5/5/2024)    Patient History     Smoking Tobacco Use: Every Day     Smokeless Tobacco Use: Never     Passive Exposure: Not on file   Alcohol Use: Not on file   Financial Resource Strain: Not on file   Food Insecurity: No Food Insecurity (5/3/2024)    Hunger Vital Sign     Worried About Running Out of Food in the Last Year: Never true     Ran Out of Food in the Last Year: Never true   Transportation Needs: No Transportation Needs (5/3/2024)    PRAPARE - Transportation     Lack of Transportation (Medical): No     Lack of Transportation (Non-Medical): No   Physical Activity: Not on file   Stress: Not on file   Social Connections: Not on file   Intimate Partner Violence: Not on file   Depression: Not on file   Housing Stability: Low Risk

## 2024-05-06 NOTE — PROGRESS NOTES
Patient’s case reviewed during interdisciplinary team meeting in Med Surg/Tele Unit 2.  Rev. George Santiago MDiv, [facility]

## 2024-05-06 NOTE — PROGRESS NOTES
Bedside and Verbal shift change report given to MAGUE Ragsdale (oncoming nurse) by GERRI Farmer (offgoing nurse). Report included the following information Nurse Handoff Report, Index, Intake/Output, MAR, and Recent Results.

## 2024-05-06 NOTE — PLAN OF CARE
Problem: Pain  Goal: Verbalizes/displays adequate comfort level or baseline comfort level  Outcome: Progressing     Problem: Genitourinary - Adult  Goal: Urinary catheter remains patent  Outcome: Progressing

## 2024-05-07 PROCEDURE — 2580000003 HC RX 258: Performed by: HOSPITALIST

## 2024-05-07 PROCEDURE — 2580000003 HC RX 258: Performed by: INTERNAL MEDICINE

## 2024-05-07 PROCEDURE — 6370000000 HC RX 637 (ALT 250 FOR IP): Performed by: HOSPITALIST

## 2024-05-07 PROCEDURE — 1200000000 HC SEMI PRIVATE

## 2024-05-07 PROCEDURE — 6360000002 HC RX W HCPCS: Performed by: INTERNAL MEDICINE

## 2024-05-07 RX ADMIN — SODIUM CHLORIDE, PRESERVATIVE FREE 10 ML: 5 INJECTION INTRAVENOUS at 10:05

## 2024-05-07 RX ADMIN — OXYCODONE 2.5 MG: 5 TABLET ORAL at 10:04

## 2024-05-07 RX ADMIN — TAMSULOSIN HYDROCHLORIDE 0.4 MG: 0.4 CAPSULE ORAL at 09:56

## 2024-05-07 RX ADMIN — ENOXAPARIN SODIUM 40 MG: 100 INJECTION SUBCUTANEOUS at 18:22

## 2024-05-07 RX ADMIN — OXYCODONE 5 MG: 5 TABLET ORAL at 18:21

## 2024-05-07 RX ADMIN — SODIUM CHLORIDE: 9 INJECTION, SOLUTION INTRAVENOUS at 05:24

## 2024-05-07 RX ADMIN — SODIUM CHLORIDE: 900 INJECTION, SOLUTION INTRAVENOUS at 10:06

## 2024-05-07 RX ADMIN — MEROPENEM 1000 MG: 1 INJECTION, POWDER, FOR SOLUTION INTRAVENOUS at 13:25

## 2024-05-07 RX ADMIN — OXYCODONE 5 MG: 5 TABLET ORAL at 23:20

## 2024-05-07 RX ADMIN — SODIUM CHLORIDE: 900 INJECTION, SOLUTION INTRAVENOUS at 00:32

## 2024-05-07 RX ADMIN — SODIUM CHLORIDE: 9 INJECTION, SOLUTION INTRAVENOUS at 13:24

## 2024-05-07 RX ADMIN — MEROPENEM 1000 MG: 1 INJECTION, POWDER, FOR SOLUTION INTRAVENOUS at 05:25

## 2024-05-07 RX ADMIN — MEROPENEM 1000 MG: 1 INJECTION, POWDER, FOR SOLUTION INTRAVENOUS at 21:10

## 2024-05-07 RX ADMIN — SODIUM CHLORIDE: 900 INJECTION, SOLUTION INTRAVENOUS at 21:11

## 2024-05-07 ASSESSMENT — PAIN DESCRIPTION - DESCRIPTORS
DESCRIPTORS: THROBBING
DESCRIPTORS: THROBBING
DESCRIPTORS: ACHING

## 2024-05-07 ASSESSMENT — PAIN DESCRIPTION - ORIENTATION
ORIENTATION: OTHER (COMMENT)
ORIENTATION: POSTERIOR
ORIENTATION: LOWER

## 2024-05-07 ASSESSMENT — PAIN SCALES - GENERAL
PAINLEVEL_OUTOF10: 5
PAINLEVEL_OUTOF10: 7
PAINLEVEL_OUTOF10: 7
PAINLEVEL_OUTOF10: 4
PAINLEVEL_OUTOF10: 7

## 2024-05-07 ASSESSMENT — PAIN - FUNCTIONAL ASSESSMENT
PAIN_FUNCTIONAL_ASSESSMENT: PREVENTS OR INTERFERES SOME ACTIVE ACTIVITIES AND ADLS
PAIN_FUNCTIONAL_ASSESSMENT: PREVENTS OR INTERFERES SOME ACTIVE ACTIVITIES AND ADLS

## 2024-05-07 ASSESSMENT — PAIN DESCRIPTION - LOCATION
LOCATION: SCROTUM

## 2024-05-07 NOTE — PLAN OF CARE
Problem: Pain  Goal: Verbalizes/displays adequate comfort level or baseline comfort level  5/7/2024 1421 by Merlin Clifford LPN  Outcome: Progressing  5/7/2024 0238 by Alisa Smith RN  Outcome: Progressing     Problem: Musculoskeletal - Adult  Goal: Return ADL status to a safe level of function  Outcome: Progressing

## 2024-05-07 NOTE — PLAN OF CARE
Problem: Discharge Planning  Goal: Discharge to home or other facility with appropriate resources  5/7/2024 0238 by Alisa Smith RN  Outcome: Progressing  5/6/2024 1353 by Merlin Clifford LPN  Outcome: Progressing     Problem: Pain  Goal: Verbalizes/displays adequate comfort level or baseline comfort level  5/7/2024 0238 by Alisa Smith RN  Outcome: Progressing  5/6/2024 1353 by Merlin Clifford LPN  Outcome: Progressing     Problem: Safety - Adult  Goal: Free from fall injury  5/7/2024 0238 by Alisa Smith RN  Outcome: Progressing  5/6/2024 1353 by Merlin Clifford LPN  Outcome: Progressing     Problem: Respiratory - Adult  Goal: Achieves optimal ventilation and oxygenation  5/6/2024 1353 by Merlin Clifford LPN  Outcome: Progressing     Problem: Musculoskeletal - Adult  Goal: Return mobility to safest level of function  5/6/2024 1353 by Merlin Clifford LPN  Outcome: Progressing  Goal: Return ADL status to a safe level of function  5/6/2024 1353 by Merlin Clifford LPN  Outcome: Progressing     Problem: Genitourinary - Adult  Goal: Urinary catheter remains patent  5/6/2024 1353 by Merlin Clifford LPN  Outcome: Progressing

## 2024-05-07 NOTE — PROGRESS NOTES
[facility] Adult  Hospitalist Group                                                                                          Hospitalist Progress Note  Alexis Carter MD  Office Phone: [phone]        Date of Service:  2024  NAME:  Joseph Grant  :  1950  MRN:  [id number]       Admission Summary:   73-year-old male past with history of BPH, urinary retention with chronic indwelling catheter BPH with urinary retention presents to the ED due to left groin pain.  Groin pain started in the morning at 4 AM and has progressively worsened.  Patient was recently admitted at [facility] on 2024 for complicated UTI.  He had a chronic Olmedo catheter due to urinary retention which was exchanged, started on tamsulosin, and scheduled for prostatectomy by urology on 2024.  His urine culture in the admission was positive for Enterobacter and he was given a 10-day course of levofloxacin ((3/25-4/3).  Patient arrived to the ED febrile with a Tmax of 102.7 Fahrenheit.  Pertinent physical exam in the ED noted patient having an ill appearance as well as left testicular tenderness on palpation.  Significant labs include sodium 133, chloride 96, lactic acid 1.25, albumin 2.6, troponin 7, WBC 10.4, hemoglobin 10.5, RDW 17.7.  UA was positive for UTI, reflex culture pending.  ECG shows normal sinus rhythm and LVH.  Ultrasound of scrotum showed left epididymis more than orchitis, small hydrocele and no evidence of torsion..  CT abdomen pelvis showed scrotal wall edema without drainable abscess or soft tissue gas, as well as enlarged prostate.  Patient was admitted for complicated UTI and epididymitis.    Interval history / Subjective:   Patient is feeling better however still has scrotal pain.    Currently, patient denies headache, vision or hearing changes, fever, chills, weakness, chest pain, dyspnea, cough, abd pain, N,V, blood in stool, melena, blood in urine, LE edema, numbness or

## 2024-05-07 NOTE — PROGRESS NOTES
responded to consult and provided patient with a Bible. No other needs at this time.  Rev. George Santiago MDiv,

## 2024-05-07 NOTE — PROGRESS NOTES
Patient’s case reviewed during interdisciplinary team meeting in Med Surg/Tele Unit 2.  Rev. George Santiago MDiv, [facility]

## 2024-05-07 NOTE — PROGRESS NOTES
0745 Bedside shift change report given to MAGUE Ragsdale (oncoming nurse) by GERRI Cuellar (offgoing nurse). Report included the following information Nurse Handoff Report, Intake/Output, MAR, and Recent Results.     1000 Scheduled med pass, PRN Oxycodone 2.5 mg    1324 Scheduled med pass    1740 Helped ambulate pt to restroom

## 2024-05-08 LAB
BACTERIA SPEC CULT: NORMAL
BACTERIA SPEC CULT: NORMAL
CLUE CELLS VAG QL WET PREP: NORMAL
SERVICE CMNT-IMP: NORMAL
SERVICE CMNT-IMP: NORMAL
T VAGINALIS VAG QL WET PREP: NORMAL
YEAST: NORMAL

## 2024-05-08 PROCEDURE — 87591 N.GONORRHOEAE DNA AMP PROB: CPT

## 2024-05-08 PROCEDURE — 1200000000 HC SEMI PRIVATE

## 2024-05-08 PROCEDURE — 6370000000 HC RX 637 (ALT 250 FOR IP): Performed by: HOSPITALIST

## 2024-05-08 PROCEDURE — 87491 CHLMYD TRACH DNA AMP PROBE: CPT

## 2024-05-08 PROCEDURE — 6360000002 HC RX W HCPCS: Performed by: INTERNAL MEDICINE

## 2024-05-08 PROCEDURE — 2580000003 HC RX 258: Performed by: INTERNAL MEDICINE

## 2024-05-08 PROCEDURE — 51702 INSERT TEMP BLADDER CATH: CPT

## 2024-05-08 PROCEDURE — 87210 SMEAR WET MOUNT SALINE/INK: CPT

## 2024-05-08 PROCEDURE — 2580000003 HC RX 258: Performed by: HOSPITALIST

## 2024-05-08 RX ADMIN — SODIUM CHLORIDE, PRESERVATIVE FREE 10 ML: 5 INJECTION INTRAVENOUS at 19:02

## 2024-05-08 RX ADMIN — MEROPENEM 1000 MG: 1 INJECTION, POWDER, FOR SOLUTION INTRAVENOUS at 13:22

## 2024-05-08 RX ADMIN — LOSARTAN POTASSIUM 25 MG: 25 TABLET, FILM COATED ORAL at 12:11

## 2024-05-08 RX ADMIN — SODIUM CHLORIDE, PRESERVATIVE FREE 10 ML: 5 INJECTION INTRAVENOUS at 13:06

## 2024-05-08 RX ADMIN — SODIUM CHLORIDE: 9 INJECTION, SOLUTION INTRAVENOUS at 13:20

## 2024-05-08 RX ADMIN — ENOXAPARIN SODIUM 40 MG: 100 INJECTION SUBCUTANEOUS at 19:02

## 2024-05-08 RX ADMIN — MEROPENEM 1000 MG: 1 INJECTION, POWDER, FOR SOLUTION INTRAVENOUS at 20:47

## 2024-05-08 RX ADMIN — OXYCODONE 5 MG: 5 TABLET ORAL at 20:36

## 2024-05-08 RX ADMIN — TAMSULOSIN HYDROCHLORIDE 0.4 MG: 0.4 CAPSULE ORAL at 12:11

## 2024-05-08 RX ADMIN — OXYCODONE 2.5 MG: 5 TABLET ORAL at 13:17

## 2024-05-08 RX ADMIN — MEROPENEM 1000 MG: 1 INJECTION, POWDER, FOR SOLUTION INTRAVENOUS at 06:00

## 2024-05-08 RX ADMIN — SODIUM CHLORIDE: 900 INJECTION, SOLUTION INTRAVENOUS at 07:00

## 2024-05-08 ASSESSMENT — PAIN DESCRIPTION - LOCATION
LOCATION: SCROTUM

## 2024-05-08 ASSESSMENT — PAIN SCALES - GENERAL
PAINLEVEL_OUTOF10: 7
PAINLEVEL_OUTOF10: 6
PAINLEVEL_OUTOF10: 5
PAINLEVEL_OUTOF10: 7
PAINLEVEL_OUTOF10: 6

## 2024-05-08 ASSESSMENT — PAIN DESCRIPTION - DESCRIPTORS: DESCRIPTORS: ACHING

## 2024-05-08 NOTE — PLAN OF CARE
Problem: Discharge Planning  Goal: Discharge to home or other facility with appropriate resources  5/8/2024 0350 by Tiffanie Mane RN  Outcome: Progressing  5/7/2024 1421 by Merlin Clifford LPN  Outcome: Progressing     Problem: Pain  Goal: Verbalizes/displays adequate comfort level or baseline comfort level  5/8/2024 0350 by Tiffanie Mane RN  Outcome: Progressing  5/7/2024 1421 by Merlin Clifford LPN  Outcome: Progressing     Problem: Safety - Adult  Goal: Free from fall injury  5/8/2024 0350 by Tiffanie Mane RN  Outcome: Progressing  5/7/2024 1421 by Merlin Clifford LPN  Outcome: Progressing     Problem: Respiratory - Adult  Goal: Achieves optimal ventilation and oxygenation  5/8/2024 0350 by Tiffanie Mane RN  Outcome: Progressing  5/7/2024 1421 by Merlin Clifford LPN  Outcome: Progressing     Problem: Musculoskeletal - Adult  Goal: Return mobility to safest level of function  5/8/2024 0350 by Tiffanie Mane RN  Outcome: Progressing  5/7/2024 1421 by Merlin Clifford LPN  Outcome: Progressing     Problem: Musculoskeletal - Adult  Goal: Return ADL status to a safe level of function  5/8/2024 0350 by Tiffanie Mane RN  Outcome: Progressing  5/7/2024 1421 by Merlin Clifford LPN  Outcome: Progressing     Problem: Genitourinary - Adult  Goal: Urinary catheter remains patent  5/8/2024 0350 by Tiffanie Mane RN  Outcome: Progressing  Flowsheets (Taken 5/7/2024 1958)  Urinary catheter remains patent: Assess patency of urinary catheter  5/7/2024 1421 by Merlin Clifford LPN  Outcome: Progressing

## 2024-05-08 NOTE — PROGRESS NOTES
Physician Progress Note      PATIENT:               OCTAVIO GRANT  CSN #:                  [id number]  :                       1950  ADMIT DATE:       5/3/2024 3:07 PM  DISCH DATE:  RESPONDING  PROVIDER #:        Angelica Carter MD          QUERY TEXT:    Pt admitted with UTI.  Pt noted to have chronic indwelling urinary catheter   with elevated WBC and temp. If possible, please document in the progress notes   and discharge summary if you are evaluating and / or treating any of the   following:    The medical record reflects the following:  Risk Factors: 73-year-old male past with history of BPH, urinary retention   with chronic indwelling catheter BPH with urinary retention presents to the ED   due to left groin pain.  Recent admission for CAUTI.  Clinical Indicators: H&P: Pt states that he has a urinary blockage and is   scheduled for surgery. He states that he has a Crawford catheter because he has   had difficulty voiding Patient states that pain is constant. On chart review   noted patient has history of BPH, urinary retention requiring crawford since   .Scheduled for prostatectomy by urology on 6/3.  Complicated  UTI  WBC 5/3 - 10.4,  - 13.6  Temp 5/3 - 101.9, 102.7, 100.5  Treatment: cefepime, cath exchanged    Thank you,  Catherine Grant RN, CDI  [email or web address]  >  Options provided:  -- Sepsis related to chronic indwelling urinary catheter  -- Sepsis ruled out and the UTI is due to chronic indwelling urinary catheter  -- UTI not due to indwelling urinary catheter  -- Other - I will add my own diagnosis  -- Disagree - Not applicable / Not valid  -- Disagree - Clinically unable to determine / Unknown  -- Refer to Clinical Documentation Reviewer    PROVIDER RESPONSE TEXT:    Sepsis is ruled out and the UTI is due to the chronic indwelling urinary   catheter.    Query created by: Catherine Grant on 2024 3:23 PM      Electronically signed by:  Angelica Carter MD 2024 11:00 AM

## 2024-05-08 NOTE — PROGRESS NOTES
Notified Dr. Carter via perfect serve that pt is complaining of pain in abdomen and penis. Feels like he still needs to use the restroom. Pt had 800 uo in last 5 hrs with 600 IV fluids received and patient's pad in bed has a good amount of leakage on it as well and is pink tinged. Pt requesting that crawford be removed and inserted again. Offered ice, elevate the scrotum, and bladder scan. Patient refused and adamant that crawford needs to be removed. Nursing supervisor bladder scanned pt and had 551. Supv called Dr. Carter and received order by phone to flush crawford and if doesn't drain to then remove crawford and insert another. Shirley Man RN flushed, removed, and inserted new 16 fr coude crawford.

## 2024-05-08 NOTE — PROGRESS NOTES
[facility] Adult  Hospitalist Group                                                                                          Hospitalist Progress Note  Alexis Carter MD  Office Phone: [phone]        Date of Service:  2024  NAME:  Joseph Grant  :  1950  MRN:  [id number]       Admission Summary:   73-year-old male past with history of BPH, urinary retention with chronic indwelling catheter BPH with urinary retention presents to the ED due to left groin pain.  Groin pain started in the morning at 4 AM and has progressively worsened.  Patient was recently admitted at [facility] on 2024 for complicated UTI.  He had a chronic Olmedo catheter due to urinary retention which was exchanged, started on tamsulosin, and scheduled for prostatectomy by urology on 2024.  His urine culture in the admission was positive for Enterobacter and he was given a 10-day course of levofloxacin ((3/25-4/3).  Patient arrived to the ED febrile with a Tmax of 102.7 Fahrenheit.  Pertinent physical exam in the ED noted patient having an ill appearance as well as left testicular tenderness on palpation.  Significant labs include sodium 133, chloride 96, lactic acid 1.25, albumin 2.6, troponin 7, WBC 10.4, hemoglobin 10.5, RDW 17.7.  UA was positive for UTI, reflex culture pending.  ECG shows normal sinus rhythm and LVH.  Ultrasound of scrotum showed left epididymis more than orchitis, small hydrocele and no evidence of torsion..  CT abdomen pelvis showed scrotal wall edema without drainable abscess or soft tissue gas, as well as enlarged prostate.  Patient was admitted for complicated UTI and epididymitis.    Interval history / Subjective:   Patient had Olmedo's catheter removed and got reinserted at patient's request last night.  Feeling better today.  Pain is much improved.    Currently, patient denies headache, vision or hearing changes, fever, chills, weakness, chest pain, dyspnea,  Transportation (Medical): No     Lack of Transportation (Non-Medical): No   Physical Activity: Not on file   Stress: Not on file   Social Connections: Not on file   Intimate Partner Violence: Not on file   Depression: Not on file   Housing Stability: Low Risk  (5/3/2024)    Housing Stability Vital Sign     Unable to Pay for Housing in the Last Year: No     Number of Places Lived in the Last Year: 1     Unstable Housing in the Last Year: No   Interpersonal Safety: Not At Risk (5/3/2024)    Interpersonal Safety Domain Source: IP Abuse Screening     Physical abuse: Denies     Verbal abuse: Denies     Emotional abuse: Denies     Financial abuse: Denies     Sexual abuse: Denies   Utilities: Not At Risk (5/3/2024)    [facility] Utilities     Threatened with loss of utilities: No       Review of Systems:   Refer to subjective      Vital Signs:    Last 24hrs VS reviewed since prior progress note. Most recent are:  Vitals:    05/08/24 0711   BP: 127/89   Pulse: 64   Resp: 18   Temp: 98.2 °F (36.8 °C)   SpO2: 98%         Intake/Output Summary (Last 24 hours) at 5/8/2024 0743  Last data filed at 5/8/2024 0327  Gross per 24 hour   Intake 3657.38 ml   Output 3725 ml   Net -67.62 ml          Physical Examination:     I had a face to face encounter with this patient and independently examined them on 5/8/2024 as outlined below:          General: No acute distress. Well developed, well nourished.  HEENT: Eyes: perrl, no discharge or icterus.   Cardiovascular: S1, S2, rrr, no mrg  Respiratory: clear to auscultation b/l  Abdomen: no suprpubic pain/tenderness, no flank pain/tenderness,  active bowel sounds on 4q, abdomen soft, nontender, no guarding or rigidity, no peritoneal signs  : scrotum less enlarged and tender  Extremities: No edema, no cyanosis, (+2) bi dorsalis pedal pulse  Neurological:  a&ox3. No facial asymmetry. Normal speech.  Mental Status: calm, cooperative.      Data Review:    Review and/or order of clinical lab test  I

## 2024-05-08 NOTE — PLAN OF CARE
Problem: Discharge Planning  Goal: Discharge to home or other facility with appropriate resources  5/8/2024 1636 by Oscar Parada RN  Outcome: Progressing  5/8/2024 0350 by Tiffanie Mane RN  Outcome: Progressing     Problem: Pain  Goal: Verbalizes/displays adequate comfort level or baseline comfort level  5/8/2024 1636 by Oscar Parada RN  Outcome: Progressing  5/8/2024 0350 by Tiffanie Mane RN  Outcome: Progressing     Problem: Safety - Adult  Goal: Free from fall injury  5/8/2024 1636 by Oscar Parada RN  Outcome: Progressing  5/8/2024 0350 by Tiffanie Mane RN  Outcome: Progressing     Problem: Respiratory - Adult  Goal: Achieves optimal ventilation and oxygenation  5/8/2024 1636 by Oscar Parada RN  Outcome: Progressing  5/8/2024 0350 by Tiffanie Mane RN  Outcome: Progressing     Problem: Musculoskeletal - Adult  Goal: Return mobility to safest level of function  5/8/2024 1636 by Oscar Parada RN  Outcome: Progressing  5/8/2024 0350 by Tiffanie Mane RN  Outcome: Progressing  Goal: Return ADL status to a safe level of function  5/8/2024 1636 by Oscar Parada RN  Outcome: Progressing  5/8/2024 0350 by Tiffanie Mane RN  Outcome: Progressing     Problem: Genitourinary - Adult  Goal: Urinary catheter remains patent  5/8/2024 1636 by Oscar Parada RN  Outcome: Progressing  5/8/2024 0350 by Tiffanie Mane RN  Outcome: Progressing  Flowsheets (Taken 5/7/2024 1958)  Urinary catheter remains patent: Assess patency of urinary catheter

## 2024-05-09 LAB
ALBUMIN SERPL-MCNC: 2 G/DL (ref 3.5–5)
ALBUMIN/GLOB SERPL: 0.4 (ref 1.1–2.2)
ALP SERPL-CCNC: 98 U/L (ref 45–117)
ALT SERPL-CCNC: 55 U/L (ref 12–78)
ANION GAP SERPL CALC-SCNC: 5 MMOL/L (ref 5–15)
AST SERPL-CCNC: 33 U/L (ref 15–37)
BILIRUB SERPL-MCNC: 0.2 MG/DL (ref 0.2–1)
BUN SERPL-MCNC: 12 MG/DL (ref 6–20)
BUN/CREAT SERPL: 11 (ref 12–20)
C TRACH DNA SPEC QL NAA+PROBE: NEGATIVE
CALCIUM SERPL-MCNC: 9.1 MG/DL (ref 8.5–10.1)
CHLORIDE SERPL-SCNC: 106 MMOL/L (ref 97–108)
CO2 SERPL-SCNC: 30 MMOL/L (ref 21–32)
CREAT SERPL-MCNC: 1.1 MG/DL (ref 0.7–1.3)
ERYTHROCYTE [DISTWIDTH] IN BLOOD BY AUTOMATED COUNT: 17.8 % (ref 11.5–14.5)
GLOBULIN SER CALC-MCNC: 5.5 G/DL (ref 2–4)
GLUCOSE SERPL-MCNC: 93 MG/DL (ref 65–100)
HCT VFR BLD AUTO: 30.9 % (ref 36.6–50.3)
HGB BLD-MCNC: 9.6 G/DL (ref 12.1–17)
MCH RBC QN AUTO: 26 PG (ref 26–34)
MCHC RBC AUTO-ENTMCNC: 31.1 G/DL (ref 30–36.5)
MCV RBC AUTO: 83.7 FL (ref 80–99)
N GONORRHOEA DNA SPEC QL NAA+PROBE: NEGATIVE
NRBC # BLD: 0 K/UL (ref 0–0.01)
NRBC BLD-RTO: 0 PER 100 WBC
PLATELET # BLD AUTO: 490 K/UL (ref 150–400)
PMV BLD AUTO: 10.4 FL (ref 8.9–12.9)
POTASSIUM SERPL-SCNC: 3.7 MMOL/L (ref 3.5–5.1)
PROT SERPL-MCNC: 7.5 G/DL (ref 6.4–8.2)
RBC # BLD AUTO: 3.69 M/UL (ref 4.1–5.7)
SAMPLE TYPE: NORMAL
SERVICE CMNT-IMP: NORMAL
SODIUM SERPL-SCNC: 141 MMOL/L (ref 136–145)
SPECIMEN SOURCE: NORMAL
WBC # BLD AUTO: 6.2 K/UL (ref 4.1–11.1)

## 2024-05-09 PROCEDURE — 2580000003 HC RX 258: Performed by: INTERNAL MEDICINE

## 2024-05-09 PROCEDURE — 6360000002 HC RX W HCPCS: Performed by: INTERNAL MEDICINE

## 2024-05-09 PROCEDURE — 36415 COLL VENOUS BLD VENIPUNCTURE: CPT

## 2024-05-09 PROCEDURE — 80053 COMPREHEN METABOLIC PANEL: CPT

## 2024-05-09 PROCEDURE — 1200000000 HC SEMI PRIVATE

## 2024-05-09 PROCEDURE — 85027 COMPLETE CBC AUTOMATED: CPT

## 2024-05-09 PROCEDURE — 6370000000 HC RX 637 (ALT 250 FOR IP): Performed by: HOSPITALIST

## 2024-05-09 RX ADMIN — SODIUM CHLORIDE, PRESERVATIVE FREE 10 ML: 5 INJECTION INTRAVENOUS at 21:02

## 2024-05-09 RX ADMIN — TAMSULOSIN HYDROCHLORIDE 0.4 MG: 0.4 CAPSULE ORAL at 09:13

## 2024-05-09 RX ADMIN — MEROPENEM 1000 MG: 1 INJECTION, POWDER, FOR SOLUTION INTRAVENOUS at 12:46

## 2024-05-09 RX ADMIN — LOSARTAN POTASSIUM 25 MG: 25 TABLET, FILM COATED ORAL at 09:13

## 2024-05-09 RX ADMIN — ENOXAPARIN SODIUM 40 MG: 100 INJECTION SUBCUTANEOUS at 18:12

## 2024-05-09 RX ADMIN — MEROPENEM 1000 MG: 1 INJECTION, POWDER, FOR SOLUTION INTRAVENOUS at 05:20

## 2024-05-09 RX ADMIN — MEROPENEM 1000 MG: 1 INJECTION, POWDER, FOR SOLUTION INTRAVENOUS at 20:59

## 2024-05-09 RX ADMIN — OXYCODONE 5 MG: 5 TABLET ORAL at 18:14

## 2024-05-09 ASSESSMENT — PAIN DESCRIPTION - PAIN TYPE: TYPE: CHRONIC PAIN

## 2024-05-09 ASSESSMENT — PAIN SCALES - GENERAL
PAINLEVEL_OUTOF10: 7
PAINLEVEL_OUTOF10: 0
PAINLEVEL_OUTOF10: 0

## 2024-05-09 ASSESSMENT — PAIN DESCRIPTION - ORIENTATION
ORIENTATION: LEFT;LOWER
ORIENTATION: LEFT

## 2024-05-09 ASSESSMENT — PAIN DESCRIPTION - DESCRIPTORS
DESCRIPTORS: ACHING
DESCRIPTORS: ACHING;DISCOMFORT

## 2024-05-09 ASSESSMENT — PAIN - FUNCTIONAL ASSESSMENT
PAIN_FUNCTIONAL_ASSESSMENT: ACTIVITIES ARE NOT PREVENTED
PAIN_FUNCTIONAL_ASSESSMENT: ACTIVITIES ARE NOT PREVENTED

## 2024-05-09 ASSESSMENT — PAIN DESCRIPTION - LOCATION
LOCATION: SCROTUM
LOCATION: SCROTUM

## 2024-05-09 ASSESSMENT — PAIN DESCRIPTION - FREQUENCY: FREQUENCY: CONTINUOUS

## 2024-05-09 ASSESSMENT — PAIN DESCRIPTION - ONSET: ONSET: ON-GOING

## 2024-05-09 NOTE — PROGRESS NOTES
1920 Bedside and Verbal shift change report given to GERRI Posadas (oncoming nurse) by GERRI Ramirez (offgoing nurse). Report included the following information Nurse Handoff Report, Intake/Output, MAR, and Recent Results.

## 2024-05-09 NOTE — DISCHARGE INSTRUCTIONS
It is important that you take the medication exactly as they are prescribed.   Keep your medication in the bottles provided by the pharmacist and keep a list of the medication names, dosages, and times to be taken in your wallet.   Do not take other medications without consulting your doctor.       NOTIFY YOUR PHYSICIAN OR GO TO THE NEAREST EMERGENCY ROOM FOR ANY OF THE FOLLOWING:   Fever over 101 degrees for 24 hours.   Chest pain, shortness of breath, fever, chills, nausea, vomiting, diarrhea, change in mentation, falling, weakness, bleeding. Severe pain or pain not relieved by medications.  Or, any other signs or symptoms that you may have questions about.      Note: You were admitted to the hospital with urinary tract infection and epididymitis(infection of tube attached to testicle).  You need to complete antibiotic course.  You need to follow-up with urologist for further evaluation.  You need to follow-up with primary care physician for continued healthcare management/screenings

## 2024-05-09 NOTE — PLAN OF CARE
Problem: Discharge Planning  Goal: Discharge to home or other facility with appropriate resources  5/8/2024 2238 by Prateek Pace RN  Outcome: Progressing  5/8/2024 1636 by Oscar Parada RN  Outcome: Progressing     Problem: Pain  Goal: Verbalizes/displays adequate comfort level or baseline comfort level  5/8/2024 2238 by Prateek Pace RN  Outcome: Progressing  5/8/2024 1636 by Oscar Parada RN  Outcome: Progressing     Problem: Safety - Adult  Goal: Free from fall injury  5/8/2024 2238 by Prateek Pace RN  Outcome: Progressing  5/8/2024 1636 by Oscar Parada RN  Outcome: Progressing     Problem: Respiratory - Adult  Goal: Achieves optimal ventilation and oxygenation  5/8/2024 2238 by Prateek Pace RN  Outcome: Progressing  5/8/2024 1636 by Oscar Parada RN  Outcome: Progressing     Problem: Musculoskeletal - Adult  Goal: Return mobility to safest level of function  5/8/2024 2238 by Prateek Pace RN  Outcome: Progressing  5/8/2024 1636 by Oscar Parada RN  Outcome: Progressing  Goal: Return ADL status to a safe level of function  5/8/2024 2238 by Prateek Pace RN  Outcome: Progressing  5/8/2024 1636 by Oscar Parada RN  Outcome: Progressing     Problem: Genitourinary - Adult  Goal: Urinary catheter remains patent  5/8/2024 2238 by Prateek Pace RN  Outcome: Progressing  5/8/2024 1636 by Oscar Parada RN  Outcome: Progressing

## 2024-05-09 NOTE — PROGRESS NOTES
[facility] Adult  Hospitalist Group                                                                                          Hospitalist Progress Note  Alexis Carter MD  Office Phone: [phone]        Date of Service:  2024  NAME:  Joseph Grant  :  1950  MRN:  [id number]       Admission Summary:   73-year-old male past with history of BPH, urinary retention with chronic indwelling catheter BPH with urinary retention presents to the ED due to left groin pain.  Groin pain started in the morning at 4 AM and has progressively worsened.  Patient was recently admitted at [facility] on 2024 for complicated UTI.  He had a chronic Olmedo catheter due to urinary retention which was exchanged, started on tamsulosin, and scheduled for prostatectomy by urology on 2024.  His urine culture in the admission was positive for Enterobacter and he was given a 10-day course of levofloxacin ((3/25-4/3).  Patient arrived to the ED febrile with a Tmax of 102.7 Fahrenheit.  Pertinent physical exam in the ED noted patient having an ill appearance as well as left testicular tenderness on palpation.  Significant labs include sodium 133, chloride 96, lactic acid 1.25, albumin 2.6, troponin 7, WBC 10.4, hemoglobin 10.5, RDW 17.7.  UA was positive for UTI, reflex culture pending.  ECG shows normal sinus rhythm and LVH.  Ultrasound of scrotum showed left epididymis more than orchitis, small hydrocele and no evidence of torsion..  CT abdomen pelvis showed scrotal wall edema without drainable abscess or soft tissue gas, as well as enlarged prostate.  Patient was admitted for complicated UTI and epididymitis.    Interval history / Subjective:   Patient did not have any acute event overnight.  Patient is feeling much better.    Currently, patient denies headache, vision or hearing changes, fever, chills, weakness, chest pain, dyspnea, cough, abd pain, N,V, blood in stool, melena, blood in  personally and independently reviewed all pertinent labs, diagnostic studies, imaging, and have provided independent interpretation of the same.     Labs:     Recent Labs     05/09/24 0518   WBC 6.2   HGB 9.6*   HCT 30.9*   *       Recent Labs     05/09/24 0518      K 3.7      CO2 30   BUN 12       Recent Labs     05/09/24 0518   ALT 55   GLOB 5.5*       No results for input(s): \"INR\", \"APTT\" in the last 72 hours.    Invalid input(s): \"PTP\"   No results for input(s): \"TIBC\", \"FERR\" in the last 72 hours.    Invalid input(s): \"FE\", \"PSAT\"   No results found for: \"RBCF\"   No results for input(s): \"PH\", \"PCO2\", \"PO2\" in the last 72 hours.  No results for input(s): \"CPK\" in the last 72 hours.    Invalid input(s): \"CPKMB\", \"CKNDX\", \"TROIQ\"  No results found for: \"CHOL\", \"CHLST\", \"CHOLV\", \"HDL\", \"HDLC\", \"LDL\"  No results found for: \"GLUCPOC\"  [unfilled]    Notes reviewed from all clinical/nonclinical/nursing services involved in patient's clinical care. Care coordination discussions were held with appropriate clinical/nonclinical/ nursing providers based on care coordination needs.         Patients current active Medications were reviewed, considered, added and adjusted based on the clinical condition today.      Home Medications were reconciled to the best of my ability given all available resources at the time of admission. Route is PO if not otherwise noted.      Admission Status:[id number]:::1}      Medications Reviewed:     Current Facility-Administered Medications   Medication Dose Route Frequency    meropenem (MERREM) 1,000 mg in sodium chloride 0.9 % 100 mL IVPB (mini-bag)  1,000 mg IntraVENous Q8H    naloxone (NARCAN) injection 0.4 mg  0.4 mg IntraVENous PRN    sodium chloride flush 0.9 % injection 5-40 mL  5-40 mL IntraVENous 2 times per day    sodium chloride flush 0.9 % injection 5-40 mL  5-40 mL IntraVENous PRN    0.9 % sodium chloride infusion   IntraVENous PRN    potassium chloride

## 2024-05-09 NOTE — PLAN OF CARE
Problem: Discharge Planning  Goal: Discharge to home or other facility with appropriate resources  Flowsheets (Taken 5/9/2024 1458)  Discharge to home or other facility with appropriate resources:   Arrange for needed discharge resources and transportation as appropriate   Identify barriers to discharge with patient and caregiver   Identify discharge learning needs (meds, wound care, etc)   Arrange for interpreters to assist at discharge as needed     Problem: Pain  Goal: Verbalizes/displays adequate comfort level or baseline comfort level  Flowsheets (Taken 5/9/2024 1458)  Verbalizes/displays adequate comfort level or baseline comfort level:   Encourage patient to monitor pain and request assistance   Assess pain using appropriate pain scale   Administer analgesics based on type and severity of pain and evaluate response     Problem: Safety - Adult  Goal: Free from fall injury  Flowsheets (Taken 5/9/2024 1458)  Free From Fall Injury: Instruct family/caregiver on patient safety

## 2024-05-10 VITALS
SYSTOLIC BLOOD PRESSURE: 129 MMHG | HEIGHT: 73 IN | TEMPERATURE: 97.9 F | HEART RATE: 72 BPM | WEIGHT: 174 LBS | BODY MASS INDEX: 23.06 KG/M2 | RESPIRATION RATE: 15 BRPM | OXYGEN SATURATION: 99 % | DIASTOLIC BLOOD PRESSURE: 71 MMHG

## 2024-05-10 LAB
BACTERIA SPEC CULT: ABNORMAL
CC UR VC: ABNORMAL
SERVICE CMNT-IMP: ABNORMAL

## 2024-05-10 PROCEDURE — 51702 INSERT TEMP BLADDER CATH: CPT

## 2024-05-10 PROCEDURE — 6360000002 HC RX W HCPCS: Performed by: INTERNAL MEDICINE

## 2024-05-10 PROCEDURE — 2580000003 HC RX 258: Performed by: INTERNAL MEDICINE

## 2024-05-10 PROCEDURE — 6370000000 HC RX 637 (ALT 250 FOR IP): Performed by: HOSPITALIST

## 2024-05-10 RX ORDER — AMOXICILLIN AND CLAVULANATE POTASSIUM 875; 125 MG/1; MG/1
1 TABLET, FILM COATED ORAL 2 TIMES DAILY
Qty: 12 TABLET | Refills: 0 | Status: SHIPPED | OUTPATIENT
Start: 2024-05-10 | End: 2024-05-16

## 2024-05-10 RX ADMIN — LOSARTAN POTASSIUM 25 MG: 25 TABLET, FILM COATED ORAL at 08:41

## 2024-05-10 RX ADMIN — MEROPENEM 1000 MG: 1 INJECTION, POWDER, FOR SOLUTION INTRAVENOUS at 05:35

## 2024-05-10 RX ADMIN — TAMSULOSIN HYDROCHLORIDE 0.4 MG: 0.4 CAPSULE ORAL at 08:41

## 2024-05-10 RX ADMIN — SODIUM CHLORIDE, PRESERVATIVE FREE 10 ML: 5 INJECTION INTRAVENOUS at 08:44

## 2024-05-10 NOTE — DISCHARGE SUMMARY
Discharge Summary   Please note that this dictation was completed with [employer], the computer voice recognition software.  Quite often unanticipated grammatical, syntax, homophones, and other interpretive errors are inadvertently transcribed by the computer software.  Please disregard these errors.  Please excuse any errors that have escaped final proofreading.    PATIENT ID: Joseph Grant  MRN: [id number]   YOB: 1950    DATE OF ADMISSION: 5/3/2024  3:07 PM    DATE OF DISCHARGE: 5/10/2024  PRIMARY CARE PROVIDER: Jax Reaves MD         ATTENDING PHYSICIAN: Alexis Carter MD  DISCHARGING PROVIDER: Alexis Carter MD       CONSULTATIONS: IP CONSULT TO SPIRITUAL SERVICES    PROCEDURES/SURGERIES: * No surgery found *    ADMITTING HPI from excerpted H&P   73-year-old male past with history of BPH, urinary retention with chronic indwelling catheter BPH with urinary retention presents to the ED due to left groin pain. Groin pain started in the morning at 4 AM and has progressively worsened. Patient was recently admitted at [facility] on 03/24/2024 for complicated UTI. He had a chronic Olmedo catheter due to urinary retention which was exchanged, started on tamsulosin, and scheduled for prostatectomy by urology on 06/03/2024. His urine culture in the admission was positive for Enterobacter and he was given a 10-day course of levofloxacin ((3/25-4/3). Patient arrived to the ED febrile with a Tmax of 102.7 Fahrenheit. Pertinent physical exam in the ED noted patient having an ill appearance as well as left testicular tenderness on palpation. Significant labs include sodium 133, chloride 96, lactic acid 1.25, albumin 2.6, troponin 7, WBC 10.4, hemoglobin 10.5, RDW 17.7. UA was positive for UTI, reflex culture pending. ECG shows normal sinus rhythm and LVH. Ultrasound of scrotum showed left epididymis more than orchitis, small hydrocele and no evidence of torsion.. CT abdomen pelvis showed scrotal wall edema without

## 2024-05-10 NOTE — PROGRESS NOTES
tingling in extremities.       Assessment & Plan:       Epididymitis seen in U/S -not sexually active  Chronic indwelling catheter  Recent UTI   -febrile 102F  -Last uti positive for enterobacter, given a course a 10-day course of levofloxacin (3/25-4/3).   -Due to recent levofloxacin use and continued UTI, there is a concern for MDRO, as such, patient was empirically on cefepime IV  -US Scrotum:IMPRESSION: Left epididymitis more than orchitis. Small hydrocele. No abscess. No mass or torsion.  CT abdomen:IMPRESSION: Scrotal wall edema without drainable abscess or soft tissue gas.  Left hydrocele. Enlarged prostate  -Unclear if the urine culture collected on the day of admission 05/03 was done without exchanging the crawford bag, its positive for ESBL ecoli, sensitive to meropenem   -Crawford is exchanged initially and then again on 05/07/2024  -Follow repeat urine culture(called lab and requested for final identification).  -Repeat urine culture only had 5000 colonies.  E. Coli, sensitive to ampicillin/sulbactam.  -Neisseria/chlamydia/trichomonas: Negative  -Will discharge patient home on oral antibiotic.     BPH  -Tamsulosin  -Plan for prostatectomy by urology on 06/03/2024.      HTN   losartan      Code status: Full Code  Prophylaxis: Lovenox  Central Line: None  Care Plan discussed with: Patient/IDR  Appointments after discharge:   urology, pcp  Anticipated Disposition: home   Inpatient  Cardiac monitoring: None         Social Determinants of Health     Tobacco Use: High Risk (5/5/2024)    Patient History     Smoking Tobacco Use: Every Day     Smokeless Tobacco Use: Never     Passive Exposure: Not on file   Alcohol Use: Not on file   Financial Resource Strain: Not on file   Food Insecurity: No Food Insecurity (5/3/2024)    Hunger Vital Sign     Worried About Running Out of Food in the Last Year: Never true     Ran Out of Food in the Last Year: Never true   Transportation Needs: No Transportation Needs (5/3/2024)     PRAPARE - Transportation     Lack of Transportation (Medical): No     Lack of Transportation (Non-Medical): No   Physical Activity: Not on file   Stress: Not on file   Social Connections: Not on file   Intimate Partner Violence: Not on file   Depression: Not on file   Housing Stability: Low Risk  (5/3/2024)    Housing Stability Vital Sign     Unable to Pay for Housing in the Last Year: No     Number of Places Lived in the Last Year: 1     Unstable Housing in the Last Year: No   Interpersonal Safety: Not At Risk (5/3/2024)    Interpersonal Safety Domain Source: IP Abuse Screening     Physical abuse: Denies     Verbal abuse: Denies     Emotional abuse: Denies     Financial abuse: Denies     Sexual abuse: Denies   Utilities: Not At Risk (5/3/2024)    [facility]     Threatened with loss of utilities: No       Review of Systems:   Refer to subjective      Vital Signs:    Last 24hrs VS reviewed since prior progress note. Most recent are:  Vitals:    05/09/24 2054   BP: (!) 140/87   Pulse: 52   Resp: 16   Temp: 98.2 °F (36.8 °C)   SpO2: 100%         Intake/Output Summary (Last 24 hours) at 5/10/2024 0748  Last data filed at 5/9/2024 2054  Gross per 24 hour   Intake 373.68 ml   Output 1650 ml   Net -1276.32 ml          Physical Examination:     I had a face to face encounter with this patient and independently examined them on 5/10/2024 as outlined below:          General: No acute distress. Well developed, well nourished.  HEENT: Eyes: perrl, no discharge or icterus.   Cardiovascular: S1, S2, rrr, no mrg  Respiratory: clear to auscultation b/l  Abdomen: no suprpubic pain/tenderness, no flank pain/tenderness,  active bowel sounds on 4q, abdomen soft, nontender, no guarding or rigidity, no peritoneal signs  :  Improved scrotal tenderness/swelling.    Extremities: No edema, no cyanosis, (+2) bi dorsalis pedal pulse  Neurological:  a&ox3. No facial asymmetry. Normal speech.  Mental Status: calm, cooperative.      Data

## 2024-05-10 NOTE — PROGRESS NOTES
Pharmacist Discharge Medication Reconciliation    Discharging Provider: Angelica Carter    Significant PMH:   Past Medical History:   Diagnosis Date    Arthritis     Arthritis     knees    Hypertension     Hypertension     , none for 3 years, cannot afford    Other ill-defined conditions(799.89) 2006    colonoscopy exc 3 polyps    Sebaceous cyst 11/6/2013    Smoker      Chief Complaint for this Admission:   Chief Complaint   Patient presents with    Groin Pain     Allergies: Patient has no known allergies.    Discharge Medications:   Current Discharge Medication List        START taking these medications    Details   amoxicillin-clavulanate (AUGMENTIN) 875-125 MG per tablet Take 1 tablet by mouth 2 times daily for 6 days  Qty: 12 tablet, Refills: 0           CONTINUE these medications which have NOT CHANGED    Details   Cyanocobalamin ([employer] B12 GUMMIES) 500 MCG CHEW Take 500 mcg by mouth daily      losartan (COZAAR) 25 MG tablet 1 tab(s) orally once a day for 90 days      tamsulosin (FLOMAX) 0.4 MG capsule Take 1 capsule by mouth daily Take 30 minutes after a meal             The patient's chart, MAR and AVS were reviewed by Brianda Reagan [facility].      Time spent: 5 min

## 2024-05-10 NOTE — PLAN OF CARE
Problem: Discharge Planning  Goal: Discharge to home or other facility with appropriate resources  5/10/2024 1130 by James Galvan RN  Outcome: Progressing  Flowsheets (Taken 5/10/2024 0845)  Discharge to home or other facility with appropriate resources:   Identify barriers to discharge with patient and caregiver   Arrange for needed discharge resources and transportation as appropriate  5/9/2024 2242 by Cuauhtemoc Russell RN  Outcome: Progressing  Flowsheets (Taken 5/9/2024 1458 by James Galvan RN)  Discharge to home or other facility with appropriate resources:   Arrange for needed discharge resources and transportation as appropriate   Identify barriers to discharge with patient and caregiver   Identify discharge learning needs (meds, wound care, etc)   Arrange for interpreters to assist at discharge as needed     Problem: Pain  Goal: Verbalizes/displays adequate comfort level or baseline comfort level  Outcome: Progressing  Flowsheets (Taken 5/10/2024 0840)  Verbalizes/displays adequate comfort level or baseline comfort level:   Encourage patient to monitor pain and request assistance   Assess pain using appropriate pain scale     Problem: Safety - Adult  Goal: Free from fall injury  5/10/2024 1130 by James Galvan RN  Outcome: Progressing  5/9/2024 2242 by Cuauhtemoc Russell RN  Outcome: Progressing  Flowsheets (Taken 5/9/2024 1458 by James Galvan RN)  Free From Fall Injury: Instruct family/caregiver on patient safety     Problem: Respiratory - Adult  Goal: Achieves optimal ventilation and oxygenation  Outcome: Progressing  Flowsheets (Taken 5/10/2024 0845)  Achieves optimal ventilation and oxygenation:   Assess for changes in respiratory status   Assess for changes in mentation and behavior     Problem: Musculoskeletal - Adult  Goal: Return mobility to safest level of function  5/10/2024 1130 by James Galvan RN  Outcome: Progressing  Flowsheets (Taken 5/10/2024 0845)  Return Mobility to Safest

## 2024-05-10 NOTE — PLAN OF CARE
Problem: Discharge Planning  Goal: Discharge to home or other facility with appropriate resources  5/10/2024 1144 by James Galvan RN  Outcome: Completed  Flowsheets (Taken 5/10/2024 0845)  Discharge to home or other facility with appropriate resources:   Identify barriers to discharge with patient and caregiver   Arrange for needed discharge resources and transportation as appropriate  5/10/2024 1130 by James Galvan RN  Outcome: Progressing  Flowsheets (Taken 5/10/2024 0845)  Discharge to home or other facility with appropriate resources:   Identify barriers to discharge with patient and caregiver   Arrange for needed discharge resources and transportation as appropriate  5/9/2024 2242 by Cuauhtemoc Russell RN  Outcome: Progressing  Flowsheets (Taken 5/9/2024 1458 by James Galvan, RN)  Discharge to home or other facility with appropriate resources:   Arrange for needed discharge resources and transportation as appropriate   Identify barriers to discharge with patient and caregiver   Identify discharge learning needs (meds, wound care, etc)   Arrange for interpreters to assist at discharge as needed     Problem: Pain  Goal: Verbalizes/displays adequate comfort level or baseline comfort level  5/10/2024 1144 by James Galvan RN  Outcome: Completed  Flowsheets (Taken 5/10/2024 0840)  Verbalizes/displays adequate comfort level or baseline comfort level:   Encourage patient to monitor pain and request assistance   Assess pain using appropriate pain scale  5/10/2024 1130 by James Galvan RN  Outcome: Progressing  Flowsheets (Taken 5/10/2024 0840)  Verbalizes/displays adequate comfort level or baseline comfort level:   Encourage patient to monitor pain and request assistance   Assess pain using appropriate pain scale     Problem: Safety - Adult  Goal: Free from fall injury  5/10/2024 1144 by James Galvan, RN  Outcome: Completed  Flowsheets (Taken 5/9/2024 1458)  Free From Fall Injury: Instruct family/caregiver  assistive devices as needed  5/10/2024 1130 by James Galvan, RN  Outcome: Progressing  Flowsheets (Taken 5/10/2024 0845)  Return ADL Status to a Safe Level of Function:   Administer medication as ordered   Assess activities of daily living deficits and provide assistive devices as needed     Problem: Genitourinary - Adult  Goal: Urinary catheter remains patent  5/10/2024 1144 by James Galvan, RN  Outcome: Completed  Flowsheets (Taken 5/10/2024 0845)  Urinary catheter remains patent: Assess patency of urinary catheter  5/10/2024 1130 by James Galvan, RN  Outcome: Progressing  Flowsheets (Taken 5/10/2024 0845)  Urinary catheter remains patent: Assess patency of urinary catheter  5/9/2024 2242 by Cuauhtemoc Russell, RN  Outcome: Progressing

## 2024-05-10 NOTE — CARE COORDINATION
Transition of Care Plan:    RUR: 10%  Prior Level of Functioning: IND  Disposition: HOME WITH SIG OTHER  If SNF or IPR: Date FOC offered: N/A  Date FOC received:   Accepting facility:   Date authorization started with reference number:   Date authorization received and expires:   Follow up appointments: ON AVS  DME needed: N/A  Transportation at discharge: SIG OTHER  IM/IMM Medicare/ letter given: YES  Important Message from Medicare Letter provided to OCTAVIO APRIL. Oral explanation was provided and all questions answered. Signed document placed on the bedside chart to be scanned under the media tab.   Copy provided to OCTAVIO CHEN  Is patient a [place] and connected with [facility]?    If yes, was [place] transfer form completed and [facility] notified?   Caregiver Contact: N/A  Discharge Caregiver contacted prior to discharge?   Care Conference needed?   Barriers to discharge:  N/A      Jax Reaves MD PCP - General Family Medicine [phone] [address] [place] [id number]      Next Steps: Follow up on 5/13/2024  Instructions: PCP May 13, 2024  @ 3:30PM  at the [facility] Office [address] [place] [address].  Please arrive 15 min early  bring ID and Insurance Card this is a hospital follow-up    [facility]     [facility] [address] [facility] [facility] [place] [address] [phone]     Next Steps: Follow up on 5/17/2024  Instructions: please keep your appointment  May  17, 2024 2 11:15AM     KURT PATEL RN CM